# Patient Record
Sex: MALE | Race: WHITE | Employment: OTHER | URBAN - METROPOLITAN AREA
[De-identification: names, ages, dates, MRNs, and addresses within clinical notes are randomized per-mention and may not be internally consistent; named-entity substitution may affect disease eponyms.]

---

## 2017-01-20 PROBLEM — M54.6 ACUTE BILATERAL THORACIC BACK PAIN: Status: ACTIVE | Noted: 2017-01-20

## 2017-01-24 ENCOUNTER — OFFICE VISIT (OUTPATIENT)
Dept: DERMATOLOGY | Age: 77
End: 2017-01-24

## 2017-01-24 DIAGNOSIS — L57.0 AK (ACTINIC KERATOSIS): Primary | ICD-10-CM

## 2017-01-24 PROCEDURE — 17004 DESTROY PREMAL LESIONS 15/>: CPT | Performed by: DERMATOLOGY

## 2017-07-14 PROBLEM — T14.8XXA BRUISING: Status: ACTIVE | Noted: 2017-07-14

## 2017-07-25 ENCOUNTER — OFFICE VISIT (OUTPATIENT)
Dept: DERMATOLOGY | Age: 77
End: 2017-07-25

## 2017-07-25 DIAGNOSIS — L82.0 INFLAMED SEBORRHEIC KERATOSIS: ICD-10-CM

## 2017-07-25 DIAGNOSIS — L57.0 AK (ACTINIC KERATOSIS): Primary | ICD-10-CM

## 2017-07-25 PROCEDURE — 99213 OFFICE O/P EST LOW 20 MIN: CPT | Performed by: DERMATOLOGY

## 2017-07-25 PROCEDURE — 17110 DESTRUCTION B9 LES UP TO 14: CPT | Performed by: DERMATOLOGY

## 2017-07-25 PROCEDURE — 17003 DESTRUCT PREMALG LES 2-14: CPT | Performed by: DERMATOLOGY

## 2017-07-25 PROCEDURE — 17000 DESTRUCT PREMALG LESION: CPT | Performed by: DERMATOLOGY

## 2017-07-25 RX ORDER — FLUOROURACIL 50 MG/G
CREAM TOPICAL
Qty: 40 G | Refills: 0 | Status: SHIPPED | OUTPATIENT
Start: 2017-07-25 | End: 2021-03-01

## 2017-11-21 ENCOUNTER — OFFICE VISIT (OUTPATIENT)
Dept: FAMILY MEDICINE CLINIC | Age: 77
End: 2017-11-21

## 2017-11-21 VITALS
DIASTOLIC BLOOD PRESSURE: 80 MMHG | BODY MASS INDEX: 30.33 KG/M2 | OXYGEN SATURATION: 98 % | SYSTOLIC BLOOD PRESSURE: 130 MMHG | WEIGHT: 215 LBS | TEMPERATURE: 97.2 F

## 2017-11-21 DIAGNOSIS — J06.9 VIRAL URI: Primary | ICD-10-CM

## 2017-11-21 PROCEDURE — 99213 OFFICE O/P EST LOW 20 MIN: CPT | Performed by: FAMILY MEDICINE

## 2017-11-21 PROCEDURE — 4040F PNEUMOC VAC/ADMIN/RCVD: CPT | Performed by: FAMILY MEDICINE

## 2017-11-21 PROCEDURE — 1036F TOBACCO NON-USER: CPT | Performed by: FAMILY MEDICINE

## 2017-11-21 PROCEDURE — G8417 CALC BMI ABV UP PARAM F/U: HCPCS | Performed by: FAMILY MEDICINE

## 2017-11-21 PROCEDURE — G8484 FLU IMMUNIZE NO ADMIN: HCPCS | Performed by: FAMILY MEDICINE

## 2017-11-21 PROCEDURE — G8598 ASA/ANTIPLAT THER USED: HCPCS | Performed by: FAMILY MEDICINE

## 2017-11-21 PROCEDURE — G8427 DOCREV CUR MEDS BY ELIG CLIN: HCPCS | Performed by: FAMILY MEDICINE

## 2017-11-21 PROCEDURE — 1123F ACP DISCUSS/DSCN MKR DOCD: CPT | Performed by: FAMILY MEDICINE

## 2017-11-21 RX ORDER — BENZONATATE 100 MG/1
100 CAPSULE ORAL 3 TIMES DAILY PRN
Qty: 30 CAPSULE | Refills: 1 | Status: SHIPPED | OUTPATIENT
Start: 2017-11-21 | End: 2017-12-01

## 2017-11-21 RX ORDER — IPRATROPIUM BROMIDE 42 UG/1
2 SPRAY, METERED NASAL 3 TIMES DAILY
Qty: 1 BOTTLE | Refills: 0 | Status: SHIPPED | OUTPATIENT
Start: 2017-11-21 | End: 2018-02-09

## 2017-11-21 RX ORDER — AMOXICILLIN AND CLAVULANATE POTASSIUM 875; 125 MG/1; MG/1
1 TABLET, FILM COATED ORAL 2 TIMES DAILY
Qty: 20 TABLET | Refills: 0 | Status: SHIPPED | OUTPATIENT
Start: 2017-11-21 | End: 2017-12-01

## 2017-11-21 ASSESSMENT — ENCOUNTER SYMPTOMS
SORE THROAT: 1
SHORTNESS OF BREATH: 0
COUGH: 1
WHEEZING: 0
SINUS PAIN: 0

## 2017-11-21 NOTE — PROGRESS NOTES
Arabella iYng  : 1940  Encounter date: 2017    This is a 76 y.o. male who presents with  Chief Complaint   Patient presents with    URI     Productive cough, nasal congestion, ear pain, sore throat. No known fever. Symptoms started about 4-5 days ago. History of present illness:    Got home yesterday from [place]. Last 4-5 days started w sx. Friend in [place] had bronchitis. Watery eyes, runny nose, hard cough, congestion. Uses cpap at night and is able to sleep through night. Would like to have some symptoms relief before the holidays. No fevers, no chills, no fatigue. Allergies   Allergen Reactions    Cardizem [Diltiazem Hcl]      bradycardia    Celebrex [Celecoxib] Other (See Comments)     Ineffective.  Darvon [Propoxyphene Hcl]     Percocet [Oxycodone-Acetaminophen]     Procardia [Nifedipine]      edema     Outpatient Prescriptions Marked as Taking for the 17 encounter (Office Visit) with Bibiana Vu MD   Medication Sig Dispense Refill    ipratropium (ATROVENT) 0.06 % nasal spray 2 sprays by Nasal route 3 times daily 1 Bottle 0    benzonatate (TESSALON PERLES) 100 MG capsule Take 1 capsule by mouth 3 times daily as needed for Cough 30 capsule 1    amoxicillin-clavulanate (AUGMENTIN) 875-125 MG per tablet Take 1 tablet by mouth 2 times daily for 10 days 20 tablet 0    LORazepam (ATIVAN) 0.5 MG tablet TAKE 1 TABLET TWICE DAILY AS NEEDED 409 tablet 0    folic acid (FOLVITE) 1 MG tablet TAKE 1 TABLET EVERY DAY (SUBSTITUTED FOR FOLVITE) 90 tablet 1    clopidogrel (PLAVIX) 75 MG tablet TAKE 1 TABLET EVERY DAY 90 tablet 3    metFORMIN (GLUCOPHAGE) 1000 MG tablet TAKE 1/2 TABLET EVERY MORNING  AND TAKE 1 TABLET EVERY EVENING 135 tablet 0    glimepiride (AMARYL) 4 MG tablet TAKE 1 TABLET TWICE DAILY 180 tablet 3    fluorouracil (EFUDEX) 5 % cream Apply twice daily to the top of the scalp twice per day for 14 days.  40 g 0    ONE TOUCH ULTRA TEST strip is cooperative. HENT:   Right Ear: Tympanic membrane and ear canal normal.   Left Ear: Tympanic membrane and ear canal normal.   Nose: Mucosal edema present. Right sinus exhibits no maxillary sinus tenderness and no frontal sinus tenderness. Left sinus exhibits no maxillary sinus tenderness and no frontal sinus tenderness. Mouth/Throat: Uvula is midline. Posterior oropharyngeal erythema present. Neck: Neck supple. Pulmonary/Chest: Effort normal. No accessory muscle usage. No respiratory distress. He has no decreased breath sounds. He has rhonchi (slight bilat). He exhibits no deformity. Lymphadenopathy:     He has no cervical adenopathy. Neurological: He is alert. Assessment/Plan    1. Viral URI  Recommended treatment with Atrovent nasal spray, Tessalon Perles for symptom relief. Due to the upcoming holiday weekend did prevent antibiotic in case of worsening symptoms including fever or more productive cough. No Follow-up on file.

## 2018-01-04 ENCOUNTER — OFFICE VISIT (OUTPATIENT)
Dept: FAMILY MEDICINE CLINIC | Age: 78
End: 2018-01-04

## 2018-01-04 VITALS
BODY MASS INDEX: 30.33 KG/M2 | DIASTOLIC BLOOD PRESSURE: 84 MMHG | SYSTOLIC BLOOD PRESSURE: 140 MMHG | HEART RATE: 74 BPM | TEMPERATURE: 98.1 F | OXYGEN SATURATION: 98 % | WEIGHT: 215 LBS

## 2018-01-04 DIAGNOSIS — B96.89 ACUTE BACTERIAL SINUSITIS: Primary | ICD-10-CM

## 2018-01-04 DIAGNOSIS — R05.9 COUGH: ICD-10-CM

## 2018-01-04 DIAGNOSIS — J01.90 ACUTE BACTERIAL SINUSITIS: Primary | ICD-10-CM

## 2018-01-04 PROCEDURE — 1036F TOBACCO NON-USER: CPT | Performed by: FAMILY MEDICINE

## 2018-01-04 PROCEDURE — G8484 FLU IMMUNIZE NO ADMIN: HCPCS | Performed by: FAMILY MEDICINE

## 2018-01-04 PROCEDURE — 1123F ACP DISCUSS/DSCN MKR DOCD: CPT | Performed by: FAMILY MEDICINE

## 2018-01-04 PROCEDURE — G8417 CALC BMI ABV UP PARAM F/U: HCPCS | Performed by: FAMILY MEDICINE

## 2018-01-04 PROCEDURE — G8598 ASA/ANTIPLAT THER USED: HCPCS | Performed by: FAMILY MEDICINE

## 2018-01-04 PROCEDURE — 99213 OFFICE O/P EST LOW 20 MIN: CPT | Performed by: FAMILY MEDICINE

## 2018-01-04 PROCEDURE — G8427 DOCREV CUR MEDS BY ELIG CLIN: HCPCS | Performed by: FAMILY MEDICINE

## 2018-01-04 PROCEDURE — 94640 AIRWAY INHALATION TREATMENT: CPT | Performed by: FAMILY MEDICINE

## 2018-01-04 PROCEDURE — 4040F PNEUMOC VAC/ADMIN/RCVD: CPT | Performed by: FAMILY MEDICINE

## 2018-01-04 RX ORDER — AMOXICILLIN AND CLAVULANATE POTASSIUM 875; 125 MG/1; MG/1
1 TABLET, FILM COATED ORAL 2 TIMES DAILY
Qty: 20 TABLET | Refills: 0 | Status: SHIPPED | OUTPATIENT
Start: 2018-01-04 | End: 2018-01-14

## 2018-01-04 RX ORDER — BENZONATATE 100 MG/1
100 CAPSULE ORAL 3 TIMES DAILY PRN
Qty: 30 CAPSULE | Refills: 2 | Status: SHIPPED | OUTPATIENT
Start: 2018-01-04 | End: 2018-01-14

## 2018-01-04 RX ORDER — ALBUTEROL SULFATE 2.5 MG/3ML
2.5 SOLUTION RESPIRATORY (INHALATION) ONCE
Status: COMPLETED | OUTPATIENT
Start: 2018-01-04 | End: 2018-01-04

## 2018-01-04 RX ADMIN — ALBUTEROL SULFATE 2.5 MG: 2.5 SOLUTION RESPIRATORY (INHALATION) at 15:00

## 2018-01-04 ASSESSMENT — ENCOUNTER SYMPTOMS
SHORTNESS OF BREATH: 1
COUGH: 1
EYE ITCHING: 0
WHEEZING: 0
EYE DISCHARGE: 0
EYE PAIN: 0
EYE REDNESS: 0
SINUS PRESSURE: 1
SINUS PAIN: 1
TROUBLE SWALLOWING: 0

## 2018-01-04 NOTE — PROGRESS NOTES
Zonia Lawrence  : 1940  Encounter date: 2018    This is a 76 y.o. male who presents with  Chief Complaint   Patient presents with    Cough     off & on since 17       History of present illness:    Use symptomatic treatment after last visit and did seem to improve somewhat, but symptoms have been worse in the last week. Hasn't gotten better since last visit. Hard coughing, but not productive. Nose drips, but can't blow anything out. OTC decongestant helps, tessalon perrles help. Never took antibiotic. No fevers that he knows of. Little short of breath, little wheezy; worse with lying down. Sinus pain - feels like hit in face. No swelling in legs. Allergies   Allergen Reactions    Cardizem [Diltiazem Hcl]      bradycardia    Celebrex [Celecoxib] Other (See Comments)     Ineffective.      Darvon [Propoxyphene Hcl]     Percocet [Oxycodone-Acetaminophen]     Procardia [Nifedipine]      edema     Outpatient Prescriptions Marked as Taking for the 18 encounter (Office Visit) with Walt Cee MD   Medication Sig Dispense Refill    amoxicillin-clavulanate (AUGMENTIN) 875-125 MG per tablet Take 1 tablet by mouth 2 times daily for 10 days 20 tablet 0    benzonatate (TESSALON PERLES) 100 MG capsule Take 1 capsule by mouth 3 times daily as needed for Cough 30 capsule 2    metFORMIN (GLUCOPHAGE) 1000 MG tablet TAKE 1/2 TABLET EVERY MORNING  AND TAKE 1 TABLET EVERY EVENING 135 tablet 0    ipratropium (ATROVENT) 0.06 % nasal spray 2 sprays by Nasal route 3 times daily 1 Bottle 0    LORazepam (ATIVAN) 0.5 MG tablet TAKE 1 TABLET TWICE DAILY AS NEEDED 425 tablet 0    folic acid (FOLVITE) 1 MG tablet TAKE 1 TABLET EVERY DAY (SUBSTITUTED FOR FOLVITE) 90 tablet 1    clopidogrel (PLAVIX) 75 MG tablet TAKE 1 TABLET EVERY DAY 90 tablet 3    glimepiride (AMARYL) 4 MG tablet TAKE 1 TABLET TWICE DAILY 180 tablet 3    fluorouracil (EFUDEX) 5 % cream Apply twice daily to the top of 07/14/17 120/80     Wt Readings from Last 3 Encounters:   01/04/18 215 lb (97.5 kg)   11/21/17 215 lb (97.5 kg)   07/14/17 207 lb (93.9 kg)       Physical Exam   Constitutional: He appears well-developed and well-nourished. He is cooperative. No distress. HENT:   Right Ear: Tympanic membrane and ear canal normal.   Left Ear: Tympanic membrane and ear canal normal.   Nose: Mucosal edema and rhinorrhea (purulent) present. Right sinus exhibits maxillary sinus tenderness and frontal sinus tenderness. Left sinus exhibits maxillary sinus tenderness and frontal sinus tenderness. Mouth/Throat: Mucous membranes are normal. Posterior oropharyngeal edema (Cobblestoning) and posterior oropharyngeal erythema present. Neck: Neck supple. Cardiovascular: Normal rate, regular rhythm and normal heart sounds. Pulmonary/Chest: Effort normal. He has decreased breath sounds (At bases). He has wheezes (Slight end expiratory wheeze right lower lobe). He has rhonchi (Left). He has no rales. Lymphadenopathy:     He has cervical adenopathy (Bilateral anterior lymphadenopathy. ). Neurological: He is alert. Assessment/Plan    1. Acute bacterial sinusitis  Symptoms have worsened since last visit and has been ongoing for over a month. We'll cover with Augmentin. Let us know if not improved by early next week or with any worsening. I suspect that cough will improve as sinus infection is treated. 2. Cough  Tessalon Perles prescribed symptomatic relief so these were refilled today. Albuterol neb given in the office to help with wheezing. If cough is not improving will need to reassess. Return if symptoms worsen or fail to improve.

## 2018-01-25 ENCOUNTER — OFFICE VISIT (OUTPATIENT)
Dept: DERMATOLOGY | Age: 78
End: 2018-01-25

## 2018-01-25 DIAGNOSIS — Z85.828 HISTORY OF BASAL CELL CARCINOMA: ICD-10-CM

## 2018-01-25 DIAGNOSIS — D69.2 SOLAR PURPURA (HCC): ICD-10-CM

## 2018-01-25 DIAGNOSIS — L57.0 AK (ACTINIC KERATOSIS): Primary | ICD-10-CM

## 2018-01-25 PROCEDURE — 1123F ACP DISCUSS/DSCN MKR DOCD: CPT | Performed by: DERMATOLOGY

## 2018-01-25 PROCEDURE — G8484 FLU IMMUNIZE NO ADMIN: HCPCS | Performed by: DERMATOLOGY

## 2018-01-25 PROCEDURE — 99213 OFFICE O/P EST LOW 20 MIN: CPT | Performed by: DERMATOLOGY

## 2018-01-25 PROCEDURE — 17003 DESTRUCT PREMALG LES 2-14: CPT | Performed by: DERMATOLOGY

## 2018-01-25 PROCEDURE — G8417 CALC BMI ABV UP PARAM F/U: HCPCS | Performed by: DERMATOLOGY

## 2018-01-25 PROCEDURE — G8427 DOCREV CUR MEDS BY ELIG CLIN: HCPCS | Performed by: DERMATOLOGY

## 2018-01-25 PROCEDURE — 17000 DESTRUCT PREMALG LESION: CPT | Performed by: DERMATOLOGY

## 2018-01-25 PROCEDURE — G8598 ASA/ANTIPLAT THER USED: HCPCS | Performed by: DERMATOLOGY

## 2018-01-25 PROCEDURE — 4040F PNEUMOC VAC/ADMIN/RCVD: CPT | Performed by: DERMATOLOGY

## 2018-01-25 PROCEDURE — 1036F TOBACCO NON-USER: CPT | Performed by: DERMATOLOGY

## 2018-01-25 NOTE — PROGRESS NOTES
Ineffective.  Darvon [Propoxyphene Hcl]     Percocet [Oxycodone-Acetaminophen]     Procardia [Nifedipine]      edema     Outpatient Prescriptions Marked as Taking for the 1/25/18 encounter (Office Visit) with Jada Acosta MD   Medication Sig Dispense Refill    metFORMIN (GLUCOPHAGE) 1000 MG tablet TAKE 1/2 TABLET EVERY MORNING  AND TAKE 1 TABLET EVERY EVENING 135 tablet 0    LORazepam (ATIVAN) 0.5 MG tablet TAKE 1 TABLET TWICE DAILY AS NEEDED 076 tablet 0    folic acid (FOLVITE) 1 MG tablet TAKE 1 TABLET EVERY DAY (SUBSTITUTED FOR FOLVITE) 90 tablet 1    clopidogrel (PLAVIX) 75 MG tablet TAKE 1 TABLET EVERY DAY 90 tablet 3    glimepiride (AMARYL) 4 MG tablet TAKE 1 TABLET TWICE DAILY 180 tablet 3    Cyanocobalamin (VITAMIN B12 PO) Take by mouth      Iron Combinations (IRON COMPLEX PO) Take by mouth      pravastatin (PRAVACHOL) 40 MG tablet Take 40 mg by mouth daily.  Multiple Vitamins-Minerals (THERAPEUTIC MULTIVITAMIN-MINERALS) tablet Take 1 tablet by mouth daily.  carvedilol (COREG) 12.5 MG tablet Take 12.5 mg by mouth 2 times daily (with meals).  Cholecalciferol (VITAMIN D-3) 1000 UNITS CAPS Take 1 capsule by mouth daily.  acetaminophen (TYLENOL) 500 MG tablet Take 500 mg by mouth every 6 hours as needed for Pain.  loperamide (IMODIUM) 2 MG capsule Take 2 mg by mouth as needed for Diarrhea.  omeprazole (PRILOSEC) 40 MG capsule Take 40 mg by mouth daily.  spironolactone (ALDACTONE) 25 MG tablet Take 25 mg by mouth daily.  tamsulosin (FLOMAX) 0.4 MG capsule Take 0.4 mg by mouth daily.  aspirin 81 MG tablet Take 81 mg by mouth daily.  Ascorbic Acid (VITAMIN C) 250 MG tablet Take 500 mg by mouth daily. Physical Examination       The following were examined and determined to be normal: Psych/Neuro, Conjunctivae/eyelids, Gums/teeth/lips, Neck, Breast/axilla/chest, Abdomen, Back, RUE and LUE.     The following were examined and

## 2018-02-09 ENCOUNTER — OFFICE VISIT (OUTPATIENT)
Dept: FAMILY MEDICINE CLINIC | Age: 78
End: 2018-02-09

## 2018-02-09 VITALS
HEART RATE: 69 BPM | DIASTOLIC BLOOD PRESSURE: 70 MMHG | OXYGEN SATURATION: 97 % | BODY MASS INDEX: 29.54 KG/M2 | HEIGHT: 71 IN | SYSTOLIC BLOOD PRESSURE: 132 MMHG | WEIGHT: 211 LBS

## 2018-02-09 DIAGNOSIS — G62.9 NEUROPATHY: Primary | ICD-10-CM

## 2018-02-09 DIAGNOSIS — G47.00 INSOMNIA, UNSPECIFIED TYPE: ICD-10-CM

## 2018-02-09 DIAGNOSIS — Z12.5 SCREENING FOR PROSTATE CANCER: ICD-10-CM

## 2018-02-09 DIAGNOSIS — E11.43 TYPE II DIABETES MELLITUS WITH PERIPHERAL AUTONOMIC NEUROPATHY (HCC): ICD-10-CM

## 2018-02-09 DIAGNOSIS — E78.5 HYPERLIPIDEMIA, UNSPECIFIED HYPERLIPIDEMIA TYPE: ICD-10-CM

## 2018-02-09 DIAGNOSIS — E61.1 IRON DEFICIENCY: ICD-10-CM

## 2018-02-09 DIAGNOSIS — I25.10 ATHEROSCLEROSIS OF NATIVE CORONARY ARTERY OF NATIVE HEART WITHOUT ANGINA PECTORIS: ICD-10-CM

## 2018-02-09 DIAGNOSIS — R06.2 WHEEZE: ICD-10-CM

## 2018-02-09 DIAGNOSIS — D64.9 ANEMIA, UNSPECIFIED TYPE: ICD-10-CM

## 2018-02-09 DIAGNOSIS — I10 ESSENTIAL HYPERTENSION: ICD-10-CM

## 2018-02-09 DIAGNOSIS — K21.9 GASTROESOPHAGEAL REFLUX DISEASE, ESOPHAGITIS PRESENCE NOT SPECIFIED: ICD-10-CM

## 2018-02-09 DIAGNOSIS — G47.33 OBSTRUCTIVE SLEEP APNEA SYNDROME: ICD-10-CM

## 2018-02-09 DIAGNOSIS — R07.89 CHEST TIGHTNESS: ICD-10-CM

## 2018-02-09 DIAGNOSIS — R19.5 LOOSE STOOLS: ICD-10-CM

## 2018-02-09 PROCEDURE — 99214 OFFICE O/P EST MOD 30 MIN: CPT | Performed by: FAMILY MEDICINE

## 2018-02-09 PROCEDURE — 1123F ACP DISCUSS/DSCN MKR DOCD: CPT | Performed by: FAMILY MEDICINE

## 2018-02-09 PROCEDURE — 1036F TOBACCO NON-USER: CPT | Performed by: FAMILY MEDICINE

## 2018-02-09 PROCEDURE — 4040F PNEUMOC VAC/ADMIN/RCVD: CPT | Performed by: FAMILY MEDICINE

## 2018-02-09 PROCEDURE — G8427 DOCREV CUR MEDS BY ELIG CLIN: HCPCS | Performed by: FAMILY MEDICINE

## 2018-02-09 PROCEDURE — G8484 FLU IMMUNIZE NO ADMIN: HCPCS | Performed by: FAMILY MEDICINE

## 2018-02-09 PROCEDURE — G8598 ASA/ANTIPLAT THER USED: HCPCS | Performed by: FAMILY MEDICINE

## 2018-02-09 PROCEDURE — 93000 ELECTROCARDIOGRAM COMPLETE: CPT | Performed by: FAMILY MEDICINE

## 2018-02-09 PROCEDURE — G8417 CALC BMI ABV UP PARAM F/U: HCPCS | Performed by: FAMILY MEDICINE

## 2018-02-09 RX ORDER — NITROGLYCERIN 0.4 MG/1
0.4 TABLET SUBLINGUAL EVERY 5 MIN PRN
Qty: 10 TABLET | Refills: 3 | Status: SHIPPED | OUTPATIENT
Start: 2018-02-09 | End: 2018-02-16 | Stop reason: SDUPTHER

## 2018-02-09 RX ORDER — LORAZEPAM 0.5 MG/1
TABLET ORAL
Qty: 180 TABLET | Refills: 0 | Status: SHIPPED | OUTPATIENT
Start: 2018-02-09 | End: 2018-05-29

## 2018-02-09 ASSESSMENT — PATIENT HEALTH QUESTIONNAIRE - PHQ9
SUM OF ALL RESPONSES TO PHQ9 QUESTIONS 1 & 2: 0
1. LITTLE INTEREST OR PLEASURE IN DOING THINGS: 0
2. FEELING DOWN, DEPRESSED OR HOPELESS: 0
SUM OF ALL RESPONSES TO PHQ QUESTIONS 1-9: 0

## 2018-02-09 ASSESSMENT — ENCOUNTER SYMPTOMS
SHORTNESS OF BREATH: 0
ABDOMINAL PAIN: 0
DIARRHEA: 1
CONSTIPATION: 0
CHEST TIGHTNESS: 0
COUGH: 0

## 2018-02-09 NOTE — PROGRESS NOTES
Aracelis Madsen  : 1940  Encounter date: 2018    This is a [de-identified] y.o. male who presents with  Chief Complaint   Patient presents with   [address]     Patient states he has list that he will go over with doctor. History of present illness:  Had some chest pain this week - tightness left side; after he got home from work. Went away in a couple of hours. Suffering with numbness in hands/feet. He has seen ads on tv/heard about treatments. When he goes to sleep at night has burning in feet that is bad. Fingers also go a little numb at this time. Would like to lose weight; has had success with weight watchers in past. Doesn't regularly test blood sugars at home. Usually is pretty careful about what he is eating. Doesn't feel numbness in all fingers at same time; different fingers. Has some trouble walking. Feels erratic with walking; not strong. Steps occasionally in front of one foot which throws off his balance. Would like some work up. Has diarrhea since having colon resection. Takes metamucil daily. Not helping. Takes imodium which helps. Wife suggested PSA, A1C. Wanted to ask about 2 pills that have gotten bad wrap - metformin, omeprazole. Tried to decrease the omeprazole to every other day and when he did he developed more acid/stomach issues. Wears his CPAP which he hates but he wears regularly. He is about to cancel trip out to [address] [place] [address] [place] feel like he can walk as far as he needs; also worries about bowels. Has oncologist, nephrologist, cardiologist, dermatology. Allergies   Allergen Reactions    Cardizem [Diltiazem Hcl]      bradycardia    Celebrex [Celecoxib] Other (See Comments)     Ineffective.      Darvon [Propoxyphene Hcl]     Percocet [Oxycodone-Acetaminophen]     Procardia [Nifedipine]      edema     Outpatient Prescriptions Marked as Taking for the 18 encounter (Office Visit) with Amber Munoz MD   Medication Sig Dispense Refill C) 250 MG tablet Take 500 mg by mouth daily. Review of Systems   Constitutional: Positive for fatigue (not as much energy as he used to have). Respiratory: Negative for cough, chest tightness and shortness of breath. Cardiovascular: Positive for chest pain (see hpi; not presently. Not occurred since episodes last week). Gastrointestinal: Positive for diarrhea (see hpi; improves with the imodium). Negative for abdominal pain and constipation. Musculoskeletal: Positive for gait problem (see hpi). Neurological: Positive for numbness. Negative for dizziness, tremors, weakness and light-headedness. Objective:    /70 (Site: Left Arm, Position: Sitting, Cuff Size: Large Adult)   Pulse 69   Ht 5' 11.25\" (1.81 m)   Wt 211 lb (95.7 kg)   SpO2 97%   BMI 29.22 kg/m²   Weight: 211 lb (95.7 kg)     BP Readings from Last 3 Encounters:   02/09/18 132/70   01/04/18 (!) 140/84   11/21/17 130/80     Wt Readings from Last 3 Encounters:   02/09/18 211 lb (95.7 kg)   01/04/18 215 lb (97.5 kg)   11/21/17 215 lb (97.5 kg)       Physical Exam   Constitutional: He appears well-developed and well-nourished. No distress. Neck: Normal range of motion. Neck supple. No thyromegaly present. Cardiovascular: Normal rate, regular rhythm and normal heart sounds. Pulmonary/Chest: Effort normal. He has no decreased breath sounds. He has wheezes (end insp wheeze left upper lobe). He has no rhonchi. He has no rales. Lymphadenopathy:     He has no cervical adenopathy. Monofilament Sensation:  abnormal - no sensation toes bilat; none ball of feet; sensation mid foot and heel normal.   Pulses:  normal,   Edema: abnormal - 1+ edema,  Skin lesions: normal   Varicosities bilat ankles        Assessment/Plan    1. Neuropathy ([address]  Suspect secondary to diabetes. We will have her complete some basic labs and make sure there are no other contributing factors. - TSH without Reflex;  Future  - Vitamin B12; Future  - Future    11. Gastroesophageal reflux disease, esophagitis presence not specified  I'm not certain that breath testing is available anymore through hospital. We discussed that be okay for him to decrease to 20 mg of omeprazole daily and see if this helps control symptoms or switch over to Zantac twice a day which would come less side effects than the omeprazole.  - H. Pylori Breath Test; Future    12. Anemia, unspecified type  - CBC with Differential; Future  - Ferritin; Future  - Iron and TIBC; Future    13. Screening for prostate cancer  We discussed that it his age we do not routinely screen with PSA for prostate cancer. I'm not certain insurance will cover this testing. I advised him to ask about cost at the lab in case this is not a covered test.  - PSA, Prostatic Specific Antigen; Future    14. Loose stools  Advised okay for him to use Imodium for loose stools. His loose stools are secondary to mechanical change since his colonoscopy. Imodium does help with his symptoms. I have also advised him to run the suggestion by his GI doctor. Return in about 1 month (around 3/9/2018).

## 2018-02-09 NOTE — PATIENT INSTRUCTIONS
Consider gabapentin for neuropathy at bedtime. For acid reflux: it's reasonable to try to decrease the omeprazole to 20mg daily or try without but take caution with reflux triggers: caffeine/coffee, Avoid eating 2 hours before bedtime, consider elevating the head of your bed, avoid chocolate, spicy, citrus, greasy, tomato. Smaller meals are better.

## 2018-02-16 RX ORDER — NITROGLYCERIN 0.4 MG/1
0.4 TABLET SUBLINGUAL EVERY 5 MIN PRN
Qty: 25 TABLET | Refills: 3 | OUTPATIENT
Start: 2018-02-16

## 2018-03-08 ENCOUNTER — HOSPITAL ENCOUNTER (OUTPATIENT)
Dept: OTHER | Age: 78
Discharge: HOME | End: 2018-03-08
Attending: FAMILY MEDICINE | Admitting: FAMILY MEDICINE

## 2018-03-08 DIAGNOSIS — E11.43 TYPE II DIABETES MELLITUS WITH PERIPHERAL AUTONOMIC NEUROPATHY (HCC): ICD-10-CM

## 2018-03-08 DIAGNOSIS — E61.1 IRON DEFICIENCY: ICD-10-CM

## 2018-03-08 DIAGNOSIS — Z12.5 SCREENING FOR PROSTATE CANCER: ICD-10-CM

## 2018-03-08 DIAGNOSIS — I10 ESSENTIAL HYPERTENSION: ICD-10-CM

## 2018-03-08 DIAGNOSIS — D64.9 ANEMIA, UNSPECIFIED TYPE: ICD-10-CM

## 2018-03-08 DIAGNOSIS — R06.2 WHEEZE: ICD-10-CM

## 2018-03-08 DIAGNOSIS — G62.9 NEUROPATHY: ICD-10-CM

## 2018-03-08 DIAGNOSIS — E78.5 HYPERLIPIDEMIA, UNSPECIFIED HYPERLIPIDEMIA TYPE: ICD-10-CM

## 2018-03-08 LAB
A/G RATIO: 1.4 (ref 1.1–2.2)
ALBUMIN SERPL-MCNC: 4.5 G/DL (ref 3.4–5)
ALP BLD-CCNC: 50 U/L (ref 40–129)
ALT SERPL-CCNC: 20 U/L (ref 10–40)
ANION GAP SERPL CALCULATED.3IONS-SCNC: 16 MMOL/L (ref 3–16)
AST SERPL-CCNC: 24 U/L (ref 15–37)
BASOPHILS ABSOLUTE: 0 K/UL (ref 0–0.2)
BASOPHILS RELATIVE PERCENT: 0.6 %
BILIRUB SERPL-MCNC: 0.8 MG/DL (ref 0–1)
BUN BLDV-MCNC: 25 MG/DL (ref 7–20)
CALCIUM SERPL-MCNC: 9.1 MG/DL (ref 8.3–10.6)
CHLORIDE BLD-SCNC: 100 MMOL/L (ref 99–110)
CHOLESTEROL, TOTAL: 102 MG/DL (ref 0–199)
CO2: 22 MMOL/L (ref 21–32)
CREAT SERPL-MCNC: 1.3 MG/DL (ref 0.8–1.3)
CREATININE URINE: 84.5 MG/DL (ref 39–259)
EOSINOPHILS ABSOLUTE: 0.2 K/UL (ref 0–0.6)
EOSINOPHILS RELATIVE PERCENT: 2.3 %
ESTIMATED AVERAGE GLUCOSE: 128.4 MG/DL
FERRITIN: 109.3 NG/ML (ref 30–400)
GFR AFRICAN AMERICAN: >60
GFR NON-AFRICAN AMERICAN: 53
GLOBULIN: 3.3 G/DL
GLUCOSE BLD-MCNC: 138 MG/DL (ref 70–99)
HBA1C MFR BLD: 6.1 %
HCT VFR BLD CALC: 39.8 % (ref 40.5–52.5)
HDLC SERPL-MCNC: 43 MG/DL (ref 40–60)
HEMOGLOBIN: 13.6 G/DL (ref 13.5–17.5)
IRON SATURATION: 31 % (ref 20–50)
IRON: 103 UG/DL (ref 59–158)
LDL CHOLESTEROL CALCULATED: 31 MG/DL
LYMPHOCYTES ABSOLUTE: 3 K/UL (ref 1–5.1)
LYMPHOCYTES RELATIVE PERCENT: 38.1 %
MCH RBC QN AUTO: 31 PG (ref 26–34)
MCHC RBC AUTO-ENTMCNC: 34.3 G/DL (ref 31–36)
MCV RBC AUTO: 90.4 FL (ref 80–100)
MICROALBUMIN UR-MCNC: <1.2 MG/DL
MICROALBUMIN/CREAT UR-RTO: NORMAL MG/G (ref 0–30)
MONOCYTES ABSOLUTE: 0.9 K/UL (ref 0–1.3)
MONOCYTES RELATIVE PERCENT: 11.6 %
NEUTROPHILS ABSOLUTE: 3.8 K/UL (ref 1.7–7.7)
NEUTROPHILS RELATIVE PERCENT: 47.4 %
PDW BLD-RTO: 13.1 % (ref 12.4–15.4)
PLATELET # BLD: 169 K/UL (ref 135–450)
PMV BLD AUTO: 9.2 FL (ref 5–10.5)
POTASSIUM SERPL-SCNC: 5.5 MMOL/L (ref 3.5–5.1)
PROSTATE SPECIFIC ANTIGEN: 0.26 NG/ML (ref 0–4)
RBC # BLD: 4.4 M/UL (ref 4.2–5.9)
SODIUM BLD-SCNC: 138 MMOL/L (ref 136–145)
TOTAL IRON BINDING CAPACITY: 334 UG/DL (ref 260–445)
TOTAL PROTEIN: 7.8 G/DL (ref 6.4–8.2)
TRIGL SERPL-MCNC: 142 MG/DL (ref 0–150)
TSH SERPL DL<=0.05 MIU/L-ACNC: 2.61 UIU/ML (ref 0.27–4.2)
VITAMIN B-12: 997 PG/ML (ref 211–911)
VLDLC SERPL CALC-MCNC: 28 MG/DL
WBC # BLD: 7.9 K/UL (ref 4–11)

## 2018-03-09 DIAGNOSIS — E87.5 HIGH POTASSIUM: Primary | ICD-10-CM

## 2018-03-15 ENCOUNTER — HOSPITAL ENCOUNTER (OUTPATIENT)
Dept: OTHER | Age: 78
Discharge: HOME | End: 2018-03-15
Attending: FAMILY MEDICINE | Admitting: FAMILY MEDICINE

## 2018-03-22 ENCOUNTER — OFFICE VISIT (OUTPATIENT)
Dept: FAMILY MEDICINE CLINIC | Age: 78
End: 2018-03-22

## 2018-03-22 VITALS
BODY MASS INDEX: 29.08 KG/M2 | SYSTOLIC BLOOD PRESSURE: 120 MMHG | DIASTOLIC BLOOD PRESSURE: 70 MMHG | OXYGEN SATURATION: 97 % | HEART RATE: 66 BPM | WEIGHT: 210 LBS

## 2018-03-22 DIAGNOSIS — R19.7 DIARRHEA, UNSPECIFIED TYPE: ICD-10-CM

## 2018-03-22 DIAGNOSIS — I10 ESSENTIAL HYPERTENSION: Primary | ICD-10-CM

## 2018-03-22 DIAGNOSIS — E11.43 TYPE II DIABETES MELLITUS WITH PERIPHERAL AUTONOMIC NEUROPATHY (HCC): ICD-10-CM

## 2018-03-22 PROCEDURE — 1036F TOBACCO NON-USER: CPT | Performed by: FAMILY MEDICINE

## 2018-03-22 PROCEDURE — G8482 FLU IMMUNIZE ORDER/ADMIN: HCPCS | Performed by: FAMILY MEDICINE

## 2018-03-22 PROCEDURE — G8417 CALC BMI ABV UP PARAM F/U: HCPCS | Performed by: FAMILY MEDICINE

## 2018-03-22 PROCEDURE — G8427 DOCREV CUR MEDS BY ELIG CLIN: HCPCS | Performed by: FAMILY MEDICINE

## 2018-03-22 PROCEDURE — 99213 OFFICE O/P EST LOW 20 MIN: CPT | Performed by: FAMILY MEDICINE

## 2018-03-22 PROCEDURE — G8598 ASA/ANTIPLAT THER USED: HCPCS | Performed by: FAMILY MEDICINE

## 2018-03-22 PROCEDURE — 4040F PNEUMOC VAC/ADMIN/RCVD: CPT | Performed by: FAMILY MEDICINE

## 2018-03-22 PROCEDURE — 1123F ACP DISCUSS/DSCN MKR DOCD: CPT | Performed by: FAMILY MEDICINE

## 2018-03-22 RX ORDER — FOLIC ACID 1 MG/1
TABLET ORAL
Qty: 90 TABLET | Refills: 2 | Status: CANCELLED | OUTPATIENT
Start: 2018-03-22

## 2018-03-22 ASSESSMENT — ENCOUNTER SYMPTOMS
ABDOMINAL PAIN: 0
ABDOMINAL DISTENTION: 0
CONSTIPATION: 0
DIARRHEA: 1
COUGH: 0
SHORTNESS OF BREATH: 0

## 2018-03-22 NOTE — PROGRESS NOTES
Nick Flynn  : 1940  Encounter date: 3/22/2018    This is a [de-identified] y.o. male who presents with  Chief Complaint   Patient presents with    1 Month Follow-Up       History of present illness:    Blood pressures have been in 136-150/67-85 at home but thinks that he needs to replace battery because he is getting some funny letters with this. Blood sugars have been in the 107, 140, 148 fasting and then 179, 198 (day after pizza), 179 post prandial.     H pylori test wasn't completed. Wondering if he should go back for this. (we discussed he doesn't need to). Takes 2 pills 2-3 times/week of imodium to function socially. Wonders if this is ok. Hx of colon resection with diarrhea issues since that time. Has cut back on omeprazole to 20mg daily; no increase in heartburn. Uses cpap with nose pads; had blood in mucous when blowing nose. Has humidified cpap. Not happened to him in past.       Allergies   Allergen Reactions    Cardizem [Diltiazem Hcl]      bradycardia    Celebrex [Celecoxib] Other (See Comments)     Ineffective.      Darvon [Propoxyphene Hcl]     Percocet [Oxycodone-Acetaminophen]     Procardia [Nifedipine]      edema     Outpatient Prescriptions Marked as Taking for the 3/22/18 encounter (Office Visit) with Maikol Pineda MD   Medication Sig Dispense Refill    metFORMIN (GLUCOPHAGE) 1000 MG tablet TAKE 1/2 TABLET EVERY MORNING  AND TAKE 1 TABLET EVERY EVENING 135 tablet 0    LORazepam (ATIVAN) 0.5 MG tablet TAKE 1 TABLET TWICE DAILY AS NEEDED. 117 tablet 0    folic acid (FOLVITE) 1 MG tablet TAKE 1 TABLET EVERY DAY (SUBSTITUTED FOR FOLVITE) 90 tablet 1    clopidogrel (PLAVIX) 75 MG tablet TAKE 1 TABLET EVERY DAY 90 tablet 3    glimepiride (AMARYL) 4 MG tablet TAKE 1 TABLET TWICE DAILY (Patient taking differently: Take 1/2 tab po BID) 180 tablet 3    ONE TOUCH ULTRA TEST strip TEST BLOOD SUGAR ONCE A DAYS AS NEEDED 100 strip 2    Cyanocobalamin (VITAMIN B12 PO) Take by mouth      Iron Combinations (IRON COMPLEX PO) Take by mouth      pravastatin (PRAVACHOL) 40 MG tablet Take 40 mg by mouth daily.  ONE TOUCH LANCETS MISC 1 each by Does not apply route daily as needed. 100 each 3    Multiple Vitamins-Minerals (THERAPEUTIC MULTIVITAMIN-MINERALS) tablet Take 1 tablet by mouth daily.  carvedilol (COREG) 12.5 MG tablet Take 12.5 mg by mouth 2 times daily (with meals).  Cholecalciferol (VITAMIN D-3) 1000 UNITS CAPS Take 1 capsule by mouth daily.  acetaminophen (TYLENOL) 500 MG tablet Take 500 mg by mouth every 6 hours as needed for Pain.  loperamide (IMODIUM) 2 MG capsule Take 2 mg by mouth as needed for Diarrhea.  Blood Glucose Calibration (ONETOUCH ULTRA CONTROL VI) by In Vitro route.  omeprazole (PRILOSEC) 40 MG capsule Take 40 mg by mouth daily.  spironolactone (ALDACTONE) 25 MG tablet Take 25 mg by mouth daily.  tamsulosin (FLOMAX) 0.4 MG capsule Take 0.4 mg by mouth daily.  aspirin 81 MG tablet Take 81 mg by mouth daily.  Ascorbic Acid (VITAMIN C) 250 MG tablet Take 500 mg by mouth daily. Review of Systems   Constitutional: Negative for chills and fever. HENT: Negative for congestion and postnasal drip. Had blood when blowing nose this morning; see hpi   Respiratory: Negative for cough and shortness of breath. Cardiovascular: Negative for chest pain. Gastrointestinal: Positive for diarrhea. Negative for abdominal distention, abdominal pain and constipation. Objective:    /70   Pulse 66   Wt 210 lb (95.3 kg)   SpO2 97%   BMI 29.08 kg/m²   Weight: 210 lb (95.3 kg)     BP Readings from Last 3 Encounters:   03/22/18 120/70   02/09/18 132/70   01/04/18 (!) 140/84     Wt Readings from Last 3 Encounters:   03/22/18 210 lb (95.3 kg)   02/09/18 211 lb (95.7 kg)   01/04/18 215 lb (97.5 kg)       Physical Exam   Constitutional: He appears well-developed and well-nourished.    HENT:

## 2018-03-29 ENCOUNTER — TELEPHONE (OUTPATIENT)
Dept: FAMILY MEDICINE CLINIC | Age: 78
End: 2018-03-29

## 2018-03-29 DIAGNOSIS — E61.1 IRON DEFICIENCY: Primary | ICD-10-CM

## 2018-04-16 DIAGNOSIS — E61.1 IRON DEFICIENCY: ICD-10-CM

## 2018-04-16 DIAGNOSIS — E87.5 HIGH POTASSIUM: ICD-10-CM

## 2018-04-17 LAB
ANION GAP SERPL CALCULATED.3IONS-SCNC: 15 MMOL/L (ref 3–16)
BUN BLDV-MCNC: 25 MG/DL (ref 7–20)
CALCIUM SERPL-MCNC: 9.5 MG/DL (ref 8.3–10.6)
CHLORIDE BLD-SCNC: 103 MMOL/L (ref 99–110)
CO2: 24 MMOL/L (ref 21–32)
CREAT SERPL-MCNC: 1.2 MG/DL (ref 0.8–1.3)
FOLATE: >20 NG/ML (ref 4.78–24.2)
GFR AFRICAN AMERICAN: >60
GFR NON-AFRICAN AMERICAN: 58
GLUCOSE BLD-MCNC: 145 MG/DL (ref 70–99)
POTASSIUM SERPL-SCNC: 4.7 MMOL/L (ref 3.5–5.1)
SODIUM BLD-SCNC: 142 MMOL/L (ref 136–145)
VITAMIN B-12: 838 PG/ML (ref 211–911)

## 2018-05-18 ENCOUNTER — TELEPHONE (OUTPATIENT)
Dept: FAMILY MEDICINE CLINIC | Age: 78
End: 2018-05-18

## 2018-05-24 DIAGNOSIS — E11.43 TYPE II DIABETES MELLITUS WITH PERIPHERAL AUTONOMIC NEUROPATHY (HCC): Primary | ICD-10-CM

## 2018-05-29 ENCOUNTER — OFFICE VISIT (OUTPATIENT)
Dept: FAMILY MEDICINE CLINIC | Age: 78
End: 2018-05-29

## 2018-05-29 VITALS
WEIGHT: 211 LBS | BODY MASS INDEX: 29.22 KG/M2 | SYSTOLIC BLOOD PRESSURE: 124 MMHG | OXYGEN SATURATION: 97 % | DIASTOLIC BLOOD PRESSURE: 68 MMHG | HEART RATE: 70 BPM

## 2018-05-29 DIAGNOSIS — R26.89 BALANCE DISORDER: Primary | ICD-10-CM

## 2018-05-29 DIAGNOSIS — I10 ESSENTIAL HYPERTENSION: ICD-10-CM

## 2018-05-29 DIAGNOSIS — E11.43 TYPE II DIABETES MELLITUS WITH PERIPHERAL AUTONOMIC NEUROPATHY (HCC): ICD-10-CM

## 2018-05-29 DIAGNOSIS — Z85.038 HISTORY OF MALIGNANT NEOPLASM OF LARGE INTESTINE: ICD-10-CM

## 2018-05-29 DIAGNOSIS — E78.5 HYPERLIPIDEMIA, UNSPECIFIED HYPERLIPIDEMIA TYPE: ICD-10-CM

## 2018-05-29 PROCEDURE — 1036F TOBACCO NON-USER: CPT | Performed by: FAMILY MEDICINE

## 2018-05-29 PROCEDURE — 99214 OFFICE O/P EST MOD 30 MIN: CPT | Performed by: FAMILY MEDICINE

## 2018-05-29 PROCEDURE — G8598 ASA/ANTIPLAT THER USED: HCPCS | Performed by: FAMILY MEDICINE

## 2018-05-29 PROCEDURE — G8417 CALC BMI ABV UP PARAM F/U: HCPCS | Performed by: FAMILY MEDICINE

## 2018-05-29 PROCEDURE — 4040F PNEUMOC VAC/ADMIN/RCVD: CPT | Performed by: FAMILY MEDICINE

## 2018-05-29 PROCEDURE — G8427 DOCREV CUR MEDS BY ELIG CLIN: HCPCS | Performed by: FAMILY MEDICINE

## 2018-05-29 PROCEDURE — 1123F ACP DISCUSS/DSCN MKR DOCD: CPT | Performed by: FAMILY MEDICINE

## 2018-05-29 RX ORDER — OMEPRAZOLE 20 MG/1
20 CAPSULE, DELAYED RELEASE ORAL DAILY
Qty: 90 CAPSULE | Refills: 1 | Status: SHIPPED | OUTPATIENT
Start: 2018-05-29 | End: 2018-10-05

## 2018-05-29 ASSESSMENT — ENCOUNTER SYMPTOMS
SHORTNESS OF BREATH: 0
COUGH: 0

## 2018-06-22 DIAGNOSIS — E11.43 TYPE II DIABETES MELLITUS WITH PERIPHERAL AUTONOMIC NEUROPATHY (HCC): ICD-10-CM

## 2018-06-23 LAB
ESTIMATED AVERAGE GLUCOSE: 142.7 MG/DL
HBA1C MFR BLD: 6.6 %

## 2018-06-25 RX ORDER — FOLIC ACID 1 MG/1
TABLET ORAL
Qty: 90 TABLET | Refills: 0 | Status: SHIPPED | OUTPATIENT
Start: 2018-06-25 | End: 2018-08-27 | Stop reason: SDUPTHER

## 2018-06-27 ENCOUNTER — HOSPITAL ENCOUNTER (OUTPATIENT)
Dept: PHYSICAL THERAPY | Age: 78
Discharge: HOME | End: 2018-06-30
Admitting: FAMILY MEDICINE

## 2018-06-27 VITALS — WEIGHT: 207 LBS | BODY MASS INDEX: 28.04 KG/M2 | HEIGHT: 72 IN

## 2018-06-27 NOTE — PROGRESS NOTES
(93.9 kg)  Weight Method: Stated  BSA (Calculated - sq m): 2.18 sq meters  BMI (Calculated): 28.1    Vision/Hearing  Vision  Vision: Impaired (glasses for reading)  Hearing  Hearing: Exceptions to [facility]  Hearing Exceptions: Hard of hearing/hearing concerns; Needs increased volume    Orientation  Orientation  Overall Orientation Status: Within Normal Limits    Social/Functional History  Social/Functional History  Lives With: Spouse  Type of Home: House  Home Layout: One level; Able to Live on Main level with bedroom/bathroom  Home Access: Stairs to enter with rails  Entrance Stairs - Number of Steps: 2  Bathroom Shower/Tub: Walk-in shower  Bathroom Toilet: Standard  Bathroom Equipment: Grab bars in shower  ADL Assistance: Independent  Homemaking Assistance: Independent  Active : Yes  Mode of Transportation: Car  Occupation: Part time employment  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 [employer]  Leisure & Hobbies: reading  Objective     Observation/Palpation  Posture: Fair    AROM RLE (degrees)  RLE General AROM: right hip flexion to 92 deg, hip abd to 25  AROM LLE (degrees)  LLE General AROM: left hip flex to 89 deg, hip abd to 25    Strength RLE  Strength RLE: Exception  R Hip Flexion: 4/5  R Hip Extension: 4/5  R Hip ABduction: 4/5  R Knee Flexion: 5/5  R Knee Extension: 5/5  R Ankle Dorsiflexion: 4+/5  Strength LLE  Strength LLE: Exception  L Hip Flexion: 4-/5  L Hip Extension: 4-/5  L Hip ABduction: 4-/5  L Knee Flexion: 5/5  L Knee Extension: 5/5  L Ankle Dorsiflexion: 4+/5  Tone RLE  RLE Tone: Normotonic  Tone LLE  LLE Tone: Normotonic  Additional Measures  Flexibility: hams in 90/90: L 45, R 32  Special Tests: sit to stand repeat in 30 sec: 7 reps; 6 min walk test: 310 m; Nguyen Balance 39/56     Bed mobility  Supine to Sit: Independent  Sit to Supine: Independent  Transfers  Sit to Stand:  (requires use of hands)  Stand to sit: Modified independent  Ambulation  Ambulation?: Yes  Ambulation 1  Surface: level tile  Device: No Device  Assistance: Independent  Quality of Gait: slow [place] with shuffling pattern, tendency to shuffle feet most noticeable with distances greater than 150 feet, occasional scissoring also increased with fatigue;  slow and careful turns with wide LILIAN  Distance: 1000 feet in clinic today  Balance  Posture: Good (good minus)  Sitting - Static: Good  Sitting - Dynamic: Good  Standing - Static: Good;-  Standing - Dynamic: Good;-  Single Leg Stance R Le  Single Leg Stance L Le     Outpatient fall risk assessment completed asking screening question if patient has fallen in the past 30 days:  [x] Yes  [] No    Based on screen for falls, patient demonstrates fall risk:  [x] Yes  [] No    Interventions based on fall risk status:  Updated Problem List within Medical History  [x] Yes   [] N/A    Asked family to assist with increased observation of the patient  [] Yes   [x] N/A    Patient kept in visible area when not closely supervised by therapist  [x] Yes   [] N/A    Repeatedly reinforce activity limits and safety needs with patient/family  [x] Yes   [] N/A    Increase frequency of rounding/monitoring patient  [x] Yes   [] N/A                             Assessment   Conditions Requiring Skilled Therapeutic Intervention  Body structures, Functions, Activity limitations: Decreased functional mobility ; Decreased ADL status; Decreased ROM; Decreased strength;Decreased balance  Treatment Diagnosis: difficulty walking and fall risk  Prognosis: Good  Decision Making: Low Complexity  Patient Education: POC and goals of physical therapy;  HEP to be distributed at next visit  Barriers to Learning: [place]  REQUIRES PT FOLLOW UP: Yes  Activity Tolerance  Activity Tolerance: Patient Tolerated treatment well         Plan   Plan  Times per week: 2 x week for 6 weeks for 12 total visits  Current Treatment Recommendations: Strengthening, ROM, Balance Training, Functional

## 2018-06-27 NOTE — FLOWSHEET NOTE
Physical Therapy Daily Treatment Note    Date:  2018    Patient Name:  Jesus Putnam    :  1940  MRN: [id number]  Restrictions/Precautions:    Medical/Treatment Diagnosis Information:   · Diagnosis: balance disorder  · Treatment Diagnosis: difficulty walking and fall risk    Tracking Information:  Physician Information Referring Practitioner: Dr. Mateo Jackson of Care Sent Date:  Signed Received:    Visit Count / Total Visits      Insurance Approved Visits  /  Approved Dates:     Insurance Information PT Insurance Information: [employer] 100% coinsur, med necess, $500 deduct    Progress Note/G-codes   [x]  Yes  []  No Next Due:      Pain level: 0/10    Subjective:  See eval    Objective:  Observation:   Test measurements:      Exercises:  Exercise/Equipment Resistance/Repetitions Other comments                                                                            Other Therapeutic Activities:  : POC and goals of PT discussed with the patient at length and patient verbally agreed to both.   Discussed fall prevention in bathroom and in tight spaces with emphasis on wide LILIAN for turning    Home Exercise Program:  : to be distributed at next visit    Manual Treatments:      Modalities:      Timed Code Treatment Minutes:  45    Total Treatment Minutes:   60    Treatment/Activity Tolerance:  [x] Patient tolerated treatment well [] Patient limited by fatigue  [] Patient limited by pain  [] Patient limited by other medical complications  [] Other:     Prognosis: [x] Good [] Fair  [] Poor    Patient Requires Follow-up: [x] Yes  [] No    Plan:   [] Continue per plan of care [] Alter current plan (see comments)  [x] Plan of care initiated [] Hold pending MD visit [] Discharge  Plan for Next Session:  Flexibility, strengthening and balance exercises    Electronically signed by:  Teodora Starr

## 2018-07-01 ENCOUNTER — HOSPITAL ENCOUNTER (OUTPATIENT)
Dept: OTHER | Age: 78
Discharge: HOME | End: 2018-07-31
Attending: FAMILY MEDICINE | Admitting: FAMILY MEDICINE

## 2018-07-11 ENCOUNTER — OFFICE VISIT (OUTPATIENT)
Dept: FAMILY MEDICINE CLINIC | Age: 78
End: 2018-07-11

## 2018-07-11 VITALS
HEART RATE: 78 BPM | DIASTOLIC BLOOD PRESSURE: 82 MMHG | WEIGHT: 206.8 LBS | HEIGHT: 72 IN | BODY MASS INDEX: 28.01 KG/M2 | SYSTOLIC BLOOD PRESSURE: 130 MMHG | OXYGEN SATURATION: 98 %

## 2018-07-11 DIAGNOSIS — E61.1 IRON DEFICIENCY: ICD-10-CM

## 2018-07-11 DIAGNOSIS — I10 ESSENTIAL HYPERTENSION: ICD-10-CM

## 2018-07-11 DIAGNOSIS — E11.43 TYPE II DIABETES MELLITUS WITH PERIPHERAL AUTONOMIC NEUROPATHY (HCC): Primary | ICD-10-CM

## 2018-07-11 DIAGNOSIS — E78.5 HYPERLIPIDEMIA, UNSPECIFIED HYPERLIPIDEMIA TYPE: ICD-10-CM

## 2018-07-11 PROCEDURE — 4040F PNEUMOC VAC/ADMIN/RCVD: CPT | Performed by: FAMILY MEDICINE

## 2018-07-11 PROCEDURE — G8598 ASA/ANTIPLAT THER USED: HCPCS | Performed by: FAMILY MEDICINE

## 2018-07-11 PROCEDURE — 1123F ACP DISCUSS/DSCN MKR DOCD: CPT | Performed by: FAMILY MEDICINE

## 2018-07-11 PROCEDURE — 1101F PT FALLS ASSESS-DOCD LE1/YR: CPT | Performed by: FAMILY MEDICINE

## 2018-07-11 PROCEDURE — 1036F TOBACCO NON-USER: CPT | Performed by: FAMILY MEDICINE

## 2018-07-11 PROCEDURE — G8427 DOCREV CUR MEDS BY ELIG CLIN: HCPCS | Performed by: FAMILY MEDICINE

## 2018-07-11 PROCEDURE — G8417 CALC BMI ABV UP PARAM F/U: HCPCS | Performed by: FAMILY MEDICINE

## 2018-07-11 PROCEDURE — 99213 OFFICE O/P EST LOW 20 MIN: CPT | Performed by: FAMILY MEDICINE

## 2018-07-11 RX ORDER — GLIMEPIRIDE 4 MG/1
2 TABLET ORAL
Qty: 180 TABLET | Refills: 3
Start: 2018-07-11 | End: 2018-09-25 | Stop reason: SDUPTHER

## 2018-07-11 ASSESSMENT — ENCOUNTER SYMPTOMS
SHORTNESS OF BREATH: 0
CHEST TIGHTNESS: 0

## 2018-08-01 ENCOUNTER — HOSPITAL ENCOUNTER (OUTPATIENT)
Dept: OTHER | Age: 78
Discharge: HOME | End: 2018-08-31
Attending: FAMILY MEDICINE | Admitting: FAMILY MEDICINE

## 2018-08-23 ENCOUNTER — OFFICE VISIT (OUTPATIENT)
Dept: DERMATOLOGY | Age: 78
End: 2018-08-23

## 2018-08-23 DIAGNOSIS — L57.0 AK (ACTINIC KERATOSIS): Primary | ICD-10-CM

## 2018-08-23 PROCEDURE — 17004 DESTROY PREMAL LESIONS 15/>: CPT | Performed by: DERMATOLOGY

## 2018-08-23 NOTE — PROGRESS NOTES
[facility]  Kg Burden MD  [address]  1940    [de-identified] y.o. male     Date of Visit: 8/23/2018    Chief Complaint: skin lesions    History of Present Illness:    He returns today to follow-up for history of actinic keratosescomplains of multiple scaly lesions on the scalp today. He was treated in the past with both cryotherapy and Efudex. Derm History:  Nodular BCC of the right upper foreheadtreated with Mohs by Dr. Magdalena Brumfield on 8/3/2016. Superficial BCC of the right superior lateral legtreated with curettage on 4/14/2016. Review of Systems:  Skin: No new or changing moles. Past Medical History, Family History, Surgical History, Medications and Allergies reviewed. Past Medical History:   Diagnosis Date    Abnormalities of the hair     Allergic rhinitis, cause unspecified     Arthritis     CAD (coronary artery disease)     Cancer ([address] 3/2009    Colon cancer and resection    Degeneration of cervical intervertebral disc     Diabetes mellitus ([address]     Heart attack ([address] 4/2010    Hyperlipidemia     Hypertension     Hypertrophy of prostate without urinary obstruction and other lower urinary tract symptoms (LUTS)     Nephrolithiasis     Osteoarthrosis, unspecified whether generalized or localized, unspecified site     Personal history of malignant neoplasm of large intestine     Unspecified hereditary and idiopathic peripheral neuropathy     Unspecified sleep apnea      Past Surgical History:   Procedure Laterality Date    CARDIAC SURGERY  4/9/2010    CABG x 2    CHOLECYSTECTOMY  7/31/2009    COLON SURGERY      MOHS SURGERY      OTHER SURGICAL HISTORY  2013    removal of portacath    TOTAL HIP ARTHROPLASTY  6/1/2012       Allergies   Allergen Reactions    Cardizem [Diltiazem Hcl]      bradycardia    Celebrex [Celecoxib] Other (See Comments)     Ineffective.      Darvon [Propoxyphene Hcl]     Percocet [Oxycodone-Acetaminophen]  Procardia [Nifedipine]      edema     Outpatient Prescriptions Marked as Taking for the 8/23/18 encounter (Office Visit) with Saskia Saavedra MD   Medication Sig Dispense Refill    glimepiride (AMARYL) 4 MG tablet Take 0.5 tablets by mouth every morning (before breakfast) 920 tablet 3    folic acid (FOLVITE) 1 MG tablet TAKE 1 TABLET EVERY DAY (SUBSTITUTED FOR FOLVITE) 90 tablet 0    omeprazole (PRILOSEC) 20 MG delayed release capsule Take 1 capsule by mouth daily 90 capsule 1    metFORMIN (GLUCOPHAGE) 1000 MG tablet TAKE 1/2 TABLET EVERY MORNING  AND TAKE 1 TABLET EVERY EVENING 135 tablet 0    nitroGLYCERIN (NITROSTAT) 0.4 MG SL tablet Place 1 tablet under the tongue every 5 minutes as needed for Chest pain up to max of 3 total doses. If no relief after 1 dose, call 911. 25 tablet 3    clopidogrel (PLAVIX) 75 MG tablet TAKE 1 TABLET EVERY DAY 90 tablet 3    fluorouracil (EFUDEX) 5 % cream Apply twice daily to the top of the scalp twice per day for 14 days. 40 g 0    ONE TOUCH ULTRA TEST strip TEST BLOOD SUGAR ONCE A DAYS AS NEEDED 100 strip 2    Cyanocobalamin (VITAMIN B12 PO) Take by mouth      Iron Combinations (IRON COMPLEX PO) Take by mouth      pravastatin (PRAVACHOL) 40 MG tablet Take 40 mg by mouth daily.  ONE TOUCH LANCETS MISC 1 each by Does not apply route daily as needed. 100 each 3    Multiple Vitamins-Minerals (THERAPEUTIC MULTIVITAMIN-MINERALS) tablet Take 1 tablet by mouth daily.  carvedilol (COREG) 12.5 MG tablet Take 12.5 mg by mouth 2 times daily (with meals).  Cholecalciferol (VITAMIN D-3) 1000 UNITS CAPS Take 1 capsule by mouth daily.  acetaminophen (TYLENOL) 500 MG tablet Take 500 mg by mouth every 6 hours as needed for Pain.  loperamide (IMODIUM) 2 MG capsule Take 2 mg by mouth as needed for Diarrhea.  Blood Glucose Calibration (ONETOUCH ULTRA CONTROL VI) by In Vitro route.  spironolactone (ALDACTONE) 25 MG tablet Take 25 mg by mouth daily.  tamsulosin (FLOMAX) 0.4 MG capsule Take 0.4 mg by mouth daily.  aspirin 81 MG tablet Take 81 mg by mouth daily.  Ascorbic Acid (VITAMIN C) 250 MG tablet Take 500 mg by mouth daily. Physical Examination       The following were examined and determined to be normal: Psych/Neuro, Conjunctivae/eyelids, Gums/teeth/lips, Neck, Breast/axilla/chest, Abdomen, Back, RUE, LUE, RLE, LLE and Nails/digits. The following were examined and determined to be abnormal: Scalp/hair and Head/face. Well appearing. 1.  Vertex scalp - 8, right brow - 1, right cheek - 2, left medial lower cheek - 1, left temple - 3: ill defined irreg shaped keratotic pink macules. Assessment and Plan     1. AK (actinic keratosis) -     2 cycles of liquid nitrogen applied to 15 AKs: Vertex scalp - 8, right brow - 1, right cheek - 2, left medial lower cheek - 1, left temple - 3. Patient was educated regarding the potential risks of blister formation, discomfort, hypopigmentation, and scar. Wound care was discussed. Continue sun protective behaviors. Return in about 7 months (around 3/23/2019).

## 2018-08-28 RX ORDER — FOLIC ACID 1 MG/1
TABLET ORAL
Qty: 90 TABLET | Refills: 0 | Status: SHIPPED | OUTPATIENT
Start: 2018-08-28 | End: 2018-11-08 | Stop reason: SDUPTHER

## 2018-09-01 ENCOUNTER — HOSPITAL ENCOUNTER (OUTPATIENT)
Dept: OTHER | Age: 78
Discharge: HOME | End: 2018-09-01
Attending: FAMILY MEDICINE | Admitting: FAMILY MEDICINE

## 2018-09-21 ENCOUNTER — TELEPHONE (OUTPATIENT)
Dept: FAMILY MEDICINE CLINIC | Age: 78
End: 2018-09-21

## 2018-09-24 DIAGNOSIS — E11.43 TYPE II DIABETES MELLITUS WITH PERIPHERAL AUTONOMIC NEUROPATHY (HCC): ICD-10-CM

## 2018-09-24 DIAGNOSIS — E61.1 IRON DEFICIENCY: ICD-10-CM

## 2018-09-24 DIAGNOSIS — I10 ESSENTIAL HYPERTENSION: ICD-10-CM

## 2018-09-24 DIAGNOSIS — E78.5 HYPERLIPIDEMIA, UNSPECIFIED HYPERLIPIDEMIA TYPE: ICD-10-CM

## 2018-09-24 LAB
A/G RATIO: 1.5 (ref 1.1–2.2)
ALBUMIN SERPL-MCNC: 4.2 G/DL (ref 3.4–5)
ALP BLD-CCNC: 74 U/L (ref 40–129)
ALT SERPL-CCNC: 16 U/L (ref 10–40)
ANION GAP SERPL CALCULATED.3IONS-SCNC: 16 MMOL/L (ref 3–16)
AST SERPL-CCNC: 20 U/L (ref 15–37)
BASOPHILS ABSOLUTE: 0 K/UL (ref 0–0.2)
BASOPHILS RELATIVE PERCENT: 0.6 %
BILIRUB SERPL-MCNC: 0.4 MG/DL (ref 0–1)
BUN BLDV-MCNC: 27 MG/DL (ref 7–20)
CALCIUM SERPL-MCNC: 9.2 MG/DL (ref 8.3–10.6)
CHLORIDE BLD-SCNC: 104 MMOL/L (ref 99–110)
CHOLESTEROL, TOTAL: 102 MG/DL (ref 0–199)
CO2: 22 MMOL/L (ref 21–32)
CREAT SERPL-MCNC: 1.4 MG/DL (ref 0.8–1.3)
EOSINOPHILS ABSOLUTE: 0.3 K/UL (ref 0–0.6)
EOSINOPHILS RELATIVE PERCENT: 4.1 %
GFR AFRICAN AMERICAN: 59
GFR NON-AFRICAN AMERICAN: 49
GLOBULIN: 2.8 G/DL
GLUCOSE BLD-MCNC: 140 MG/DL (ref 70–99)
HCT VFR BLD CALC: 41 % (ref 40.5–52.5)
HDLC SERPL-MCNC: 38 MG/DL (ref 40–60)
HEMOGLOBIN: 13.5 G/DL (ref 13.5–17.5)
LDL CHOLESTEROL CALCULATED: 33 MG/DL
LYMPHOCYTES ABSOLUTE: 2.7 K/UL (ref 1–5.1)
LYMPHOCYTES RELATIVE PERCENT: 33.9 %
MCH RBC QN AUTO: 30.2 PG (ref 26–34)
MCHC RBC AUTO-ENTMCNC: 32.9 G/DL (ref 31–36)
MCV RBC AUTO: 91.8 FL (ref 80–100)
MONOCYTES ABSOLUTE: 1 K/UL (ref 0–1.3)
MONOCYTES RELATIVE PERCENT: 12.1 %
NEUTROPHILS ABSOLUTE: 4 K/UL (ref 1.7–7.7)
NEUTROPHILS RELATIVE PERCENT: 49.3 %
PDW BLD-RTO: 13.4 % (ref 12.4–15.4)
PLATELET # BLD: 177 K/UL (ref 135–450)
PMV BLD AUTO: 9.7 FL (ref 5–10.5)
POTASSIUM SERPL-SCNC: 4.6 MMOL/L (ref 3.5–5.1)
RBC # BLD: 4.47 M/UL (ref 4.2–5.9)
SODIUM BLD-SCNC: 142 MMOL/L (ref 136–145)
TOTAL PROTEIN: 7 G/DL (ref 6.4–8.2)
TRIGL SERPL-MCNC: 154 MG/DL (ref 0–150)
VLDLC SERPL CALC-MCNC: 31 MG/DL
WBC # BLD: 8.1 K/UL (ref 4–11)

## 2018-09-24 NOTE — TELEPHONE ENCOUNTER
Called patient and he states he is taking 2 mg in the morning and at night. Last week he checked his BS three times during the week.     9/21/18 - 127 - Fasting  9/19/18 - 119 - Fasting  9/21/18 - 130 - Fasting    Scanned July BS readings into his chart

## 2018-09-25 LAB
ESTIMATED AVERAGE GLUCOSE: 145.6 MG/DL
HBA1C MFR BLD: 6.7 %

## 2018-09-25 RX ORDER — GLIMEPIRIDE 4 MG/1
2 TABLET ORAL 2 TIMES DAILY
Qty: 90 TABLET | Refills: 1
Start: 2018-09-25 | End: 2021-03-01

## 2018-10-05 ENCOUNTER — OFFICE VISIT (OUTPATIENT)
Dept: FAMILY MEDICINE CLINIC | Age: 78
End: 2018-10-05
Payer: MEDICARE

## 2018-10-05 VITALS
WEIGHT: 207 LBS | DIASTOLIC BLOOD PRESSURE: 74 MMHG | SYSTOLIC BLOOD PRESSURE: 132 MMHG | HEART RATE: 71 BPM | BODY MASS INDEX: 28.07 KG/M2 | OXYGEN SATURATION: 98 %

## 2018-10-05 DIAGNOSIS — I10 ESSENTIAL HYPERTENSION: ICD-10-CM

## 2018-10-05 DIAGNOSIS — E11.43 TYPE II DIABETES MELLITUS WITH PERIPHERAL AUTONOMIC NEUROPATHY (HCC): Primary | ICD-10-CM

## 2018-10-05 DIAGNOSIS — H35.30 MACULAR DEGENERATION OF RIGHT EYE, UNSPECIFIED TYPE: ICD-10-CM

## 2018-10-05 DIAGNOSIS — E78.5 HYPERLIPIDEMIA, UNSPECIFIED HYPERLIPIDEMIA TYPE: ICD-10-CM

## 2018-10-05 DIAGNOSIS — R79.89 ELEVATED SERUM CREATININE: ICD-10-CM

## 2018-10-05 DIAGNOSIS — G47.33 OBSTRUCTIVE SLEEP APNEA SYNDROME: ICD-10-CM

## 2018-10-05 DIAGNOSIS — D69.2 SENILE PURPURA (HCC): ICD-10-CM

## 2018-10-05 DIAGNOSIS — R19.7 DIARRHEA, UNSPECIFIED TYPE: ICD-10-CM

## 2018-10-05 PROBLEM — T14.8XXA BRUISING: Status: RESOLVED | Noted: 2017-07-14 | Resolved: 2018-10-05

## 2018-10-05 PROCEDURE — G8598 ASA/ANTIPLAT THER USED: HCPCS | Performed by: FAMILY MEDICINE

## 2018-10-05 PROCEDURE — 1101F PT FALLS ASSESS-DOCD LE1/YR: CPT | Performed by: FAMILY MEDICINE

## 2018-10-05 PROCEDURE — 4040F PNEUMOC VAC/ADMIN/RCVD: CPT | Performed by: FAMILY MEDICINE

## 2018-10-05 PROCEDURE — G8427 DOCREV CUR MEDS BY ELIG CLIN: HCPCS | Performed by: FAMILY MEDICINE

## 2018-10-05 PROCEDURE — 99214 OFFICE O/P EST MOD 30 MIN: CPT | Performed by: FAMILY MEDICINE

## 2018-10-05 PROCEDURE — G8484 FLU IMMUNIZE NO ADMIN: HCPCS | Performed by: FAMILY MEDICINE

## 2018-10-05 PROCEDURE — 1036F TOBACCO NON-USER: CPT | Performed by: FAMILY MEDICINE

## 2018-10-05 PROCEDURE — G8417 CALC BMI ABV UP PARAM F/U: HCPCS | Performed by: FAMILY MEDICINE

## 2018-10-05 PROCEDURE — 1123F ACP DISCUSS/DSCN MKR DOCD: CPT | Performed by: FAMILY MEDICINE

## 2018-10-05 NOTE — PROGRESS NOTES
Chencho José  : 1940  Encounter date: 10/5/2018    This is a [de-identified] y.o. male who presents for a chronic condition visit. Chief Complaint   Patient presents with    3 Month Follow-Up       Would like cologuard and wondering about how to get this completed. Is scheduled with endocrinologist next month. Has worried about his sugars elevating since I had recommended decreasing the glimepiride. A1C has increased from 6 to 6.7 which he worries is too high. Has what he refers to as \"strong days\" and ones that are not as strong in terms of how he feels overall. He has recently decided to retire as . Omeprazole: OTC is much more expensive than prescription. Wondering about getting rx for this. Bowel issues still hit him at random. Takes 4-6 imodium/week if he feels queezy stomach/diarrhea. Bruising on arms: just wondering if there is anything to prevent this. Did well with therapy. Enjoyed this. Feels that it helped him. Hearing is going. Wondering about getting ears checked. Used to have issues with cerumen impaction. Not interested in following up with audiology at this time. Diabetes Mellitus Type 2: Current symptoms/problems include none. Medication compliance:  compliant all of the time  Diabetic diet compliance:  compliant most of the time,  Weight trend: stable  Current exercise: no regular exercise  Barriers: none    Home blood sugar records: fasting sugars in the 110-140 range; no sugars over 160. Recent log to be scanned into chart. Any episodes of hypoglycemia? no  Eye exam current (within one year): yes   reports that he has never smoked. He has never used smokeless tobacco.   Daily Aspirin? Yes (and plavix)    Hypertension:  Home blood pressure monitoring: Yes - similar to today's in office reading. He is adherent to a low sodium diet. Patient denies chest pain, shortness of breath, peripheral edema and palpitations.   Antihypertensive medication side

## 2018-10-06 ASSESSMENT — ENCOUNTER SYMPTOMS
SHORTNESS OF BREATH: 0
COUGH: 0

## 2018-11-14 RX ORDER — FOLIC ACID 1 MG/1
TABLET ORAL
Qty: 90 TABLET | Refills: 0 | Status: SHIPPED | OUTPATIENT
Start: 2018-11-14

## 2019-03-25 ENCOUNTER — OFFICE VISIT (OUTPATIENT)
Dept: DERMATOLOGY | Age: 79
End: 2019-03-25
Payer: MEDICARE

## 2019-03-25 DIAGNOSIS — L82.0 INFLAMED SEBORRHEIC KERATOSIS: ICD-10-CM

## 2019-03-25 DIAGNOSIS — L57.0 ACTINIC KERATOSIS: Primary | ICD-10-CM

## 2019-03-25 DIAGNOSIS — L84 CALLUS OF FOOT: ICD-10-CM

## 2019-03-25 PROCEDURE — 17000 DESTRUCT PREMALG LESION: CPT | Performed by: DERMATOLOGY

## 2019-03-25 PROCEDURE — G8598 ASA/ANTIPLAT THER USED: HCPCS | Performed by: DERMATOLOGY

## 2019-03-25 PROCEDURE — G8417 CALC BMI ABV UP PARAM F/U: HCPCS | Performed by: DERMATOLOGY

## 2019-03-25 PROCEDURE — 4040F PNEUMOC VAC/ADMIN/RCVD: CPT | Performed by: DERMATOLOGY

## 2019-03-25 PROCEDURE — 99213 OFFICE O/P EST LOW 20 MIN: CPT | Performed by: DERMATOLOGY

## 2019-03-25 PROCEDURE — 1101F PT FALLS ASSESS-DOCD LE1/YR: CPT | Performed by: DERMATOLOGY

## 2019-03-25 PROCEDURE — 1123F ACP DISCUSS/DSCN MKR DOCD: CPT | Performed by: DERMATOLOGY

## 2019-03-25 PROCEDURE — 1036F TOBACCO NON-USER: CPT | Performed by: DERMATOLOGY

## 2019-03-25 PROCEDURE — G8484 FLU IMMUNIZE NO ADMIN: HCPCS | Performed by: DERMATOLOGY

## 2019-03-25 PROCEDURE — 17003 DESTRUCT PREMALG LES 2-14: CPT | Performed by: DERMATOLOGY

## 2019-03-25 PROCEDURE — G8427 DOCREV CUR MEDS BY ELIG CLIN: HCPCS | Performed by: DERMATOLOGY

## 2019-03-25 PROCEDURE — 17110 DESTRUCTION B9 LES UP TO 14: CPT | Performed by: DERMATOLOGY

## 2019-04-25 ENCOUNTER — TELEPHONE (OUTPATIENT)
Dept: DERMATOLOGY | Age: 79
End: 2019-04-25

## 2019-04-25 NOTE — TELEPHONE ENCOUNTER
Patient called and Dr. Kady Laura had given  him two names of podiatrist.  He lost his papers and would like for us to call him back with those names. Thanks!     Call back# [phone]

## 2019-04-26 NOTE — TELEPHONE ENCOUNTER
Called and provided pt with the name of  the podiatrist Dr. Merlinda Bevels suggested.     Call complete

## 2019-08-26 ENCOUNTER — OFFICE VISIT (OUTPATIENT)
Dept: DERMATOLOGY | Age: 79
End: 2019-08-26
Payer: MEDICARE

## 2019-08-26 DIAGNOSIS — L57.0 AK (ACTINIC KERATOSIS): Primary | ICD-10-CM

## 2019-08-26 DIAGNOSIS — D69.2 SOLAR PURPURA (HCC): ICD-10-CM

## 2019-08-26 DIAGNOSIS — L82.1 SK (SEBORRHEIC KERATOSIS): ICD-10-CM

## 2019-08-26 DIAGNOSIS — D48.5 NEOPLASM OF UNCERTAIN BEHAVIOR OF SKIN: ICD-10-CM

## 2019-08-26 PROCEDURE — 4040F PNEUMOC VAC/ADMIN/RCVD: CPT | Performed by: DERMATOLOGY

## 2019-08-26 PROCEDURE — G8598 ASA/ANTIPLAT THER USED: HCPCS | Performed by: DERMATOLOGY

## 2019-08-26 PROCEDURE — 1123F ACP DISCUSS/DSCN MKR DOCD: CPT | Performed by: DERMATOLOGY

## 2019-08-26 PROCEDURE — 11102 TANGNTL BX SKIN SINGLE LES: CPT | Performed by: DERMATOLOGY

## 2019-08-26 PROCEDURE — 17004 DESTROY PREMAL LESIONS 15/>: CPT | Performed by: DERMATOLOGY

## 2019-08-26 PROCEDURE — 99213 OFFICE O/P EST LOW 20 MIN: CPT | Performed by: DERMATOLOGY

## 2019-08-26 PROCEDURE — 1036F TOBACCO NON-USER: CPT | Performed by: DERMATOLOGY

## 2019-08-26 PROCEDURE — G8417 CALC BMI ABV UP PARAM F/U: HCPCS | Performed by: DERMATOLOGY

## 2019-08-26 PROCEDURE — G8427 DOCREV CUR MEDS BY ELIG CLIN: HCPCS | Performed by: DERMATOLOGY

## 2019-08-28 LAB — DERMATOLOGY PATHOLOGY REPORT: ABNORMAL

## 2019-09-06 ENCOUNTER — PROCEDURE VISIT (OUTPATIENT)
Dept: DERMATOLOGY | Age: 79
End: 2019-09-06
Payer: MEDICARE

## 2019-09-06 DIAGNOSIS — C44.622 SCC (SQUAMOUS CELL CARCINOMA), ARM, RIGHT: Primary | ICD-10-CM

## 2019-09-06 PROCEDURE — 17261 DSTRJ MAL LES T/A/L .6-1.0CM: CPT | Performed by: DERMATOLOGY

## 2019-09-06 NOTE — PROGRESS NOTES
[facility]  Jonelle Villalba MD  [address] [facility]  1940    78 y.o. male     Date of Visit: 9/6/2019    Chief Complaint: SCC    History of Present Illness:    Here today for treatment of a small SCC on the right forearm. RESULTS   DIAGNOSIS   DIAGNOSIS:   RIGHT FOREARM-   Superficial squamous cell carcinoma, moderately   differentiated   There are atypical keratinocytes within the basal cell   layer.  Focally the atypical keratinocytes with moderate   pleomorphism are proliferating downward into the   superficial dermis producing a pattern of early squamous   cell carcinoma. Rex Carvajal MD       Review of Systems:  None. Past Medical History, Family History, Surgical History, Medications and Allergies reviewed. Past Medical History:   Diagnosis Date    Abnormalities of the hair     Allergic rhinitis, cause unspecified     Arthritis     CAD (coronary artery disease)     Cancer ([facility] 3/2009    Colon cancer and resection    Degeneration of cervical intervertebral disc     Diabetes mellitus ([facility]     Heart attack ([facility] 4/2010    Hyperlipidemia     Hypertension     Hypertrophy of prostate without urinary obstruction and other lower urinary tract symptoms (LUTS)     Nephrolithiasis     Osteoarthrosis, unspecified whether generalized or localized, unspecified site     Personal history of malignant neoplasm of large intestine     Unspecified hereditary and idiopathic peripheral neuropathy     Unspecified sleep apnea      Past Surgical History:   Procedure Laterality Date    CARDIAC SURGERY  4/9/2010    CABG x 2    CHOLECYSTECTOMY  7/31/2009    COLON SURGERY      MOHS SURGERY      OTHER SURGICAL HISTORY  2013    removal of portacath    TOTAL HIP ARTHROPLASTY  6/1/2012       Allergies   Allergen Reactions    Cardizem [Diltiazem Hcl]      bradycardia    Celebrex [Celecoxib] Other (See Comments)     Ineffective.     

## 2020-03-23 ENCOUNTER — TELEPHONE (OUTPATIENT)
Dept: ENT CLINIC | Age: 80
End: 2020-03-23

## 2020-03-23 NOTE — TELEPHONE ENCOUNTER
Patient called. He said he is a Dr. Jhonny Quiles patient, he saw his PCP last week and was treated with Antibiotic for Ear infection. He was recommended that he see his ENT if not better. I told the patient that I would route a message and see if I can put him on as a patient for Dr. Jeremiah Kilpatrick if urgent. Please advise.

## 2020-03-23 NOTE — TELEPHONE ENCOUNTER
Patient called office and states Dr Gino Lubin office sent him to us. Patient states that he has had three rounds of antibiotics and has swelling near ear, denies fever, having a lot of pain. States cannot hear when drops are going in ear. Patient stressed he has not used his cpap for two weeks and \"normally gets moisture up there\"  Explained to patient the CDC guidelines and risk with COVID guidelines and patient then asked what was he to do. I suggested to patient to call pcp back for pain control medicine. Will forward message to Dr Ghulam Briggs.

## 2020-03-23 NOTE — TELEPHONE ENCOUNTER
He should see Dr. Faisal Barnes if he has continued earache, or hearing loss, or drainage.   If none of the above are happeeing, he needs to consider the risk of coming in given his age

## 2020-03-23 NOTE — TELEPHONE ENCOUNTER
Patient advised of Dr. Jacklyn Fairbanks recommendations. He said he is having the symptoms mentioned. Patient was given the phone number to contact [facility] for appointment.

## 2020-03-24 ENCOUNTER — OFFICE VISIT (OUTPATIENT)
Dept: ENT CLINIC | Age: 80
End: 2020-03-24
Payer: MEDICARE

## 2020-03-24 VITALS
DIASTOLIC BLOOD PRESSURE: 68 MMHG | BODY MASS INDEX: 27.22 KG/M2 | SYSTOLIC BLOOD PRESSURE: 134 MMHG | WEIGHT: 201 LBS | HEIGHT: 72 IN | HEART RATE: 63 BPM

## 2020-03-24 PROCEDURE — G8427 DOCREV CUR MEDS BY ELIG CLIN: HCPCS | Performed by: OTOLARYNGOLOGY

## 2020-03-24 PROCEDURE — G8484 FLU IMMUNIZE NO ADMIN: HCPCS | Performed by: OTOLARYNGOLOGY

## 2020-03-24 PROCEDURE — G8417 CALC BMI ABV UP PARAM F/U: HCPCS | Performed by: OTOLARYNGOLOGY

## 2020-03-24 PROCEDURE — 1123F ACP DISCUSS/DSCN MKR DOCD: CPT | Performed by: OTOLARYNGOLOGY

## 2020-03-24 PROCEDURE — 4040F PNEUMOC VAC/ADMIN/RCVD: CPT | Performed by: OTOLARYNGOLOGY

## 2020-03-24 PROCEDURE — 4130F TOPICAL PREP RX AOE: CPT | Performed by: OTOLARYNGOLOGY

## 2020-03-24 PROCEDURE — 92504 EAR MICROSCOPY EXAMINATION: CPT | Performed by: OTOLARYNGOLOGY

## 2020-03-24 PROCEDURE — 99203 OFFICE O/P NEW LOW 30 MIN: CPT | Performed by: OTOLARYNGOLOGY

## 2020-03-24 PROCEDURE — 1036F TOBACCO NON-USER: CPT | Performed by: OTOLARYNGOLOGY

## 2020-03-24 ASSESSMENT — ENCOUNTER SYMPTOMS
BACK PAIN: 0
CONSTIPATION: 0
RHINORRHEA: 0
VOMITING: 0
BLOOD IN STOOL: 0
SHORTNESS OF BREATH: 0
SORE THROAT: 0
WHEEZING: 0
CHOKING: 0
STRIDOR: 0
DIARRHEA: 0
EYE ITCHING: 0
TROUBLE SWALLOWING: 0
COUGH: 0
FACIAL SWELLING: 0
SINUS PRESSURE: 0
VOICE CHANGE: 0
NAUSEA: 0
SINUS PAIN: 0
EYE DISCHARGE: 0
PHOTOPHOBIA: 0
COLOR CHANGE: 0

## 2020-03-24 NOTE — PROGRESS NOTES
[facility]  [facility] [place], [address]  P: [phone]  F: [phone]       Patient     Jennifer Hall  1940    ChiefComplaint     Chief Complaint   Patient presents with    Ear Problem     Patient is here today because his right ear is popping and crackling, with pain and pressure, onset past 2 weeks       History of Present Illness     Jennifre Hall is a pleasant 78 y.o. male who presents as a referral for right otitis externa. He was having right-sided ear pain and otorrhea. He saw his PCP on March 16. At that time he was noted to have purulent otorrhea from the right ear canal as well as some edema and swelling of the canal.  He was placed on Ciprodex drops 3 times daily for 7 days. He was then placed on cortisporin drops. He continues to have ear pain and otorrhea. Endorses hearing loss in right. Admits to mild dizziness. Patient is diabetic, non-insulin-dependent. Sugars well controlled typically not above 170. Most recent A1c in the 7 range.     Past Medical History     Past Medical History:   Diagnosis Date    Abnormalities of the hair     Allergic rhinitis, cause unspecified     Arthritis     CAD (coronary artery disease)     Cancer ([address] 3/2009    Colon cancer and resection    Degeneration of cervical intervertebral disc     Diabetes mellitus ([address]     Heart attack ([address] 4/2010    Hyperlipidemia     Hypertension     Hypertrophy of prostate without urinary obstruction and other lower urinary tract symptoms (LUTS)     Nephrolithiasis     Osteoarthrosis, unspecified whether generalized or localized, unspecified site     Personal history of malignant neoplasm of large intestine     Unspecified hereditary and idiopathic peripheral neuropathy     Unspecified sleep apnea        Past Surgical History     Past Surgical History:   Procedure Laterality Date    CARDIAC SURGERY  4/9/2010    CABG x 2    CHOLECYSTECTOMY  7/31/2009    COLON SURGERY      MOHS SURGERY      OTHER SURGICAL HISTORY  2013    removal of portacath    TOTAL HIP ARTHROPLASTY  6/1/2012       Family History     Family History   Problem Relation Age of Onset    Stroke Other     Diabetes Other     High Blood Pressure Other        Social History     Social History     Socioeconomic History    Marital status:      Spouse name: Not on file    Number of children: Not on file    Years of education: Not on file    Highest education level: Not on file   Occupational History    Not on file   Social Needs    Financial resource strain: Not on file    Food insecurity     Worry: Not on file     Inability: Not on file    Transportation needs     Medical: Not on file     Non-medical: Not on file   Tobacco Use    Smoking status: Never Smoker    Smokeless tobacco: Never Used   Substance and Sexual Activity    Alcohol use: Yes     Comment: rarely    Drug use: No    Sexual activity: Not on file   Lifestyle    Physical activity     Days per week: Not on file     Minutes per session: Not on file    Stress: Not on file   Relationships    Social connections     Talks on phone: Not on file     Gets together: Not on file     Attends Bahai service: Not on file     Active member of club or organization: Not on file     Attends meetings of clubs or organizations: Not on file     Relationship status: Not on file    Intimate partner violence     Fear of current or ex partner: Not on file     Emotionally abused: Not on file     Physically abused: Not on file     Forced sexual activity: Not on file   Other Topics Concern    Not on file   Social History Narrative    Not on file       Allergies     Allergies   Allergen Reactions    Cardizem [Diltiazem Hcl]      bradycardia    Celebrex [Celecoxib] Other (See Comments)     Ineffective.      Darvon [Propoxyphene Hcl]     Percocet [Oxycodone-Acetaminophen]     Procardia [Nifedipine]      edema       Medications     Current Outpatient Medications   Medication Sig Dispense Refill    neomycin-polymyxin-hydrocortisone (CORTISPORIN) 3.5-86859-0 otic solution Place 4 drops into the right ear 3 times daily for 14 days 1 Bottle 1    ONE TOUCH ULTRA TEST strip TEST BLOOD SUGAR ONCE A DAY AS NEEDED 890 strip 1    folic acid (FOLVITE) 1 MG tablet TAKE 1 TABLET EVERY DAY (SUBSTITUTED FOR FOLVITE) 90 tablet 0    clopidogrel (PLAVIX) 75 MG tablet TAKE 1 TABLET EVERY DAY 90 tablet 3    glimepiride (AMARYL) 4 MG tablet Take 0.5 tablets by mouth 2 times daily 90 tablet 1    metFORMIN (GLUCOPHAGE) 1000 MG tablet TAKE 1/2 TABLET EVERY MORNING  AND TAKE 1 TABLET EVERY EVENING 135 tablet 0    nitroGLYCERIN (NITROSTAT) 0.4 MG SL tablet Place 1 tablet under the tongue every 5 minutes as needed for Chest pain up to max of 3 total doses. If no relief after 1 dose, call 911. 25 tablet 3    fluorouracil (EFUDEX) 5 % cream Apply twice daily to the top of the scalp twice per day for 14 days. 40 g 0    Cyanocobalamin (VITAMIN B12 PO) Take by mouth      Iron Combinations (IRON COMPLEX PO) Take by mouth      pravastatin (PRAVACHOL) 40 MG tablet Take 40 mg by mouth daily.  ONE TOUCH LANCETS MISC 1 each by Does not apply route daily as needed. 100 each 3    Multiple Vitamins-Minerals (THERAPEUTIC MULTIVITAMIN-MINERALS) tablet Take 1 tablet by mouth daily.  carvedilol (COREG) 12.5 MG tablet Take 12.5 mg by mouth 2 times daily (with meals).  Cholecalciferol (VITAMIN D-3) 1000 UNITS CAPS Take 1 capsule by mouth daily.  acetaminophen (TYLENOL) 500 MG tablet Take 500 mg by mouth every 6 hours as needed for Pain.  loperamide (IMODIUM) 2 MG capsule Take 2 mg by mouth as needed for Diarrhea.  Blood Glucose Calibration (ONETOUCH ULTRA CONTROL VI) by In Vitro route.  spironolactone (ALDACTONE) 25 MG tablet Take 25 mg by mouth daily.  tamsulosin (FLOMAX) 0.4 MG capsule Take 0.4 mg by mouth daily.         aspirin 81 MG tablet Take 81 mg by mouth daily.  Ascorbic Acid (VITAMIN C) 250 MG tablet Take 500 mg by mouth daily. No current facility-administered medications for this visit. Review of Systems     Review of Systems   Constitutional: Negative for activity change, appetite change, chills, diaphoresis, fatigue, fever and unexpected weight change. HENT: Positive for ear discharge, ear pain and hearing loss. Negative for congestion, dental problem, drooling, facial swelling, mouth sores, nosebleeds, postnasal drip, rhinorrhea, sinus pressure, sinus pain, sneezing, sore throat, tinnitus, trouble swallowing and voice change. Eyes: Negative for photophobia, discharge, itching and visual disturbance. Respiratory: Negative for cough, choking, shortness of breath, wheezing and stridor. Gastrointestinal: Negative for blood in stool, constipation, diarrhea, nausea and vomiting. Endocrine: Negative for cold intolerance, heat intolerance, polyphagia and polyuria. Musculoskeletal: Negative for back pain, gait problem, neck pain and neck stiffness. Skin: Negative for color change, pallor, rash and wound. Neurological: Negative for dizziness, syncope, facial asymmetry, speech difficulty, light-headedness, numbness and headaches. Hematological: Negative for adenopathy. Does not bruise/bleed easily. Psychiatric/Behavioral: Negative for agitation, confusion and sleep disturbance. PhysicalExam     Vitals:    03/24/20 1044   BP: 134/68   Pulse: 63       Physical Exam  Constitutional:       Appearance: He is well-developed. HENT:      Head: Normocephalic and atraumatic. Not macrocephalic and not microcephalic. No raccoon eyes, Arguello's sign, abrasion, contusion, right periorbital erythema, left periorbital erythema or laceration. Hair is normal.      Jaw: No trismus. Right Ear: Hearing, tympanic membrane and external ear normal. No decreased hearing noted. Drainage, swelling and tenderness present. No middle ear effusion. No mastoid tenderness. Tympanic membrane is not perforated, retracted or bulging. Tympanic membrane has normal mobility. Left Ear: Hearing, tympanic membrane and external ear normal. No decreased hearing noted. No drainage, swelling or tenderness. No middle ear effusion. No mastoid tenderness. Tympanic membrane is not perforated, retracted or bulging. Tympanic membrane has normal mobility. Nose: No nasal deformity, septal deviation, laceration, mucosal edema or rhinorrhea. Right Sinus: No maxillary sinus tenderness or frontal sinus tenderness. Left Sinus: No maxillary sinus tenderness or frontal sinus tenderness. Mouth/Throat:      Mouth: Mucous membranes are not pale, not dry and not cyanotic. No lacerations or oral lesions. Dentition: Normal dentition. No dental caries or dental abscesses. Pharynx: Uvula midline. No oropharyngeal exudate, posterior oropharyngeal erythema or uvula swelling. Tonsils: No tonsillar abscesses. Eyes:      General: Lids are normal.         Right eye: No discharge. Left eye: No discharge. Extraocular Movements:      Right eye: Normal extraocular motion and no nystagmus. Left eye: Normal extraocular motion and no nystagmus. Conjunctiva/sclera:      Right eye: No chemosis or exudate. Left eye: No chemosis or exudate. Neck:      Musculoskeletal: Neck supple. Thyroid: No thyroid mass or thyromegaly. Vascular: Normal carotid pulses. Trachea: No tracheal tenderness or tracheal deviation. Cardiovascular:      Rate and Rhythm: Normal rate and regular rhythm. Pulmonary:      Effort: No tachypnea, bradypnea or respiratory distress. Breath sounds: No stridor. Musculoskeletal:      Right shoulder: He exhibits normal range of motion.    Lymphadenopathy:      Head:      Right side of head: No submental, submandibular, tonsillar, preauricular, posterior auricular or occipital 1    2. Polyp of right ear canal        Return in about 2 weeks (around 4/7/2020). Portions of this note were dictated using Dragon.  There may be linguistic errors secondary to the use of this program.

## 2020-03-31 ENCOUNTER — TELEPHONE (OUTPATIENT)
Dept: ENT CLINIC | Age: 80
End: 2020-03-31

## 2020-06-01 ENCOUNTER — TELEPHONE (OUTPATIENT)
Dept: DERMATOLOGY | Age: 80
End: 2020-06-01

## 2020-06-02 ENCOUNTER — TELEPHONE (OUTPATIENT)
Dept: DERMATOLOGY | Age: 80
End: 2020-06-02

## 2020-06-23 ENCOUNTER — OFFICE VISIT (OUTPATIENT)
Dept: DERMATOLOGY | Age: 80
End: 2020-06-23
Payer: MEDICARE

## 2020-06-23 VITALS — TEMPERATURE: 97.6 F

## 2020-06-23 PROCEDURE — 1123F ACP DISCUSS/DSCN MKR DOCD: CPT | Performed by: DERMATOLOGY

## 2020-06-23 PROCEDURE — 1036F TOBACCO NON-USER: CPT | Performed by: DERMATOLOGY

## 2020-06-23 PROCEDURE — 17003 DESTRUCT PREMALG LES 2-14: CPT | Performed by: DERMATOLOGY

## 2020-06-23 PROCEDURE — 4040F PNEUMOC VAC/ADMIN/RCVD: CPT | Performed by: DERMATOLOGY

## 2020-06-23 PROCEDURE — 17000 DESTRUCT PREMALG LESION: CPT | Performed by: DERMATOLOGY

## 2020-06-23 PROCEDURE — G8427 DOCREV CUR MEDS BY ELIG CLIN: HCPCS | Performed by: DERMATOLOGY

## 2020-06-23 PROCEDURE — G8417 CALC BMI ABV UP PARAM F/U: HCPCS | Performed by: DERMATOLOGY

## 2020-06-23 PROCEDURE — 99213 OFFICE O/P EST LOW 20 MIN: CPT | Performed by: DERMATOLOGY

## 2020-06-23 RX ORDER — FLUOROURACIL 50 MG/G
CREAM TOPICAL
Qty: 40 G | Refills: 0 | Status: SHIPPED | OUTPATIENT
Start: 2020-06-23 | End: 2020-07-14 | Stop reason: SDUPTHER

## 2020-06-23 NOTE — PROGRESS NOTES
[address] OTHER SURGICAL HISTORY  2013    removal of portacath    TOTAL HIP ARTHROPLASTY  6/1/2012       Allergies   Allergen Reactions    Cardizem [Diltiazem Hcl]      bradycardia    Celebrex [Celecoxib] Other (See Comments)     Ineffective.  Darvon [Propoxyphene Hcl]     Percocet [Oxycodone-Acetaminophen]     Procardia [Nifedipine]      edema     Outpatient Medications Marked as Taking for the 6/23/20 encounter (Office Visit) with Yina Baum MD   Medication Sig Dispense Refill    fluorouracil (EFUDEX) 5 % cream Apply to the top of the scalp, temples and cheeks twice daily for 14 days. 40 g 0    ONE TOUCH ULTRA TEST strip TEST BLOOD SUGAR ONCE A DAY AS NEEDED 257 strip 1    folic acid (FOLVITE) 1 MG tablet TAKE 1 TABLET EVERY DAY (SUBSTITUTED FOR FOLVITE) 90 tablet 0    clopidogrel (PLAVIX) 75 MG tablet TAKE 1 TABLET EVERY DAY 90 tablet 3    glimepiride (AMARYL) 4 MG tablet Take 0.5 tablets by mouth 2 times daily 90 tablet 1    metFORMIN (GLUCOPHAGE) 1000 MG tablet TAKE 1/2 TABLET EVERY MORNING  AND TAKE 1 TABLET EVERY EVENING 135 tablet 0    nitroGLYCERIN (NITROSTAT) 0.4 MG SL tablet Place 1 tablet under the tongue every 5 minutes as needed for Chest pain up to max of 3 total doses. If no relief after 1 dose, call 911. 25 tablet 3    fluorouracil (EFUDEX) 5 % cream Apply twice daily to the top of the scalp twice per day for 14 days. 40 g 0    Cyanocobalamin (VITAMIN B12 PO) Take by mouth      Iron Combinations (IRON COMPLEX PO) Take by mouth      pravastatin (PRAVACHOL) 40 MG tablet Take 40 mg by mouth daily.  ONE TOUCH LANCETS MISC 1 each by Does not apply route daily as needed. 100 each 3    Multiple Vitamins-Minerals (THERAPEUTIC MULTIVITAMIN-MINERALS) tablet Take 1 tablet by mouth daily.  carvedilol (COREG) 12.5 MG tablet Take 12.5 mg by mouth 2 times daily (with meals).  Cholecalciferol (VITAMIN D-3) 1000 UNITS CAPS Take 1 capsule by mouth daily.       acetaminophen (TYLENOL) 500 MG tablet Take 500 mg by mouth every 6 hours as needed for Pain.  loperamide (IMODIUM) 2 MG capsule Take 2 mg by mouth as needed for Diarrhea.  Blood Glucose Calibration (ONETOUCH ULTRA CONTROL VI) by In Vitro route.  spironolactone (ALDACTONE) 25 MG tablet Take 25 mg by mouth daily.  tamsulosin (FLOMAX) 0.4 MG capsule Take 0.4 mg by mouth daily.  aspirin 81 MG tablet Take 81 mg by mouth daily.  Ascorbic Acid (VITAMIN C) 250 MG tablet Take 500 mg by mouth daily. Physical Examination       The following were examined and determined to be normal: Psych/Neuro, Conjunctivae/eyelids, Gums/teeth/lips, Neck, Breast/axilla/chest, Abdomen, Back and LUE. The following were examined and determined to be abnormal: Scalp/hair, Head/face and RUE. Well appearing. 1.  Vertex scalp, temples, lateral cheeks and lateral neck with multiple keratotic erythematous macules, papules and patches. Right lower lateral arm with a scaly erythematous patch. Right nasal tip with a scaly skin colored to pink macule. 2.  Central back with a round skin colored nodule with overlying punctum. 3.  Lower back with few stuck on appearing verrucous brown papules. Assessment and Plan     1. Actinic keratosis -multiple hyperkeratotic lesions on the scalp, face and neck today    Efudex cream twice daily to the affected areas on the vertex scalp, lateral portions of the face and neck for 14 days (gave instructions to use up to 28 days if needed to achieve crusting of the lesions). We discussed the likely side effects of treatment including redness, crusting, itching and burning. 2 cycles of liquid nitrogen applied to 5 AKs on the vertex scalp and 1 on the right arm. Patient was educated regarding the potential risks of blister formation, discomfort, hypopigmentation, and scar. Wound care was discussed.       2. Epidermoid cyst - not inflamed    Reassurance. 3. SK (seborrheic keratosis)     Reassurance. Return in about 2 months (around 8/23/2020).

## 2020-06-23 NOTE — PATIENT INSTRUCTIONS
Julianne Roth,    Let's treat Kevin's to of the scalp, temples and sides of the cheeks near/under the ears with fluorouracil 5% cream twice per day for the next 14 days. I want the areas to scab/crust over, so it may take longer than 14 days to achieve that - if need be use up to 28 days.       Thanks,    Brijesh Liu

## 2020-07-14 ENCOUNTER — TELEPHONE (OUTPATIENT)
Dept: DERMATOLOGY | Age: 80
End: 2020-07-14

## 2020-07-14 RX ORDER — FLUOROURACIL 50 MG/G
CREAM TOPICAL
Qty: 40 G | Refills: 0 | Status: SHIPPED | OUTPATIENT
Start: 2020-07-14 | End: 2021-03-01 | Stop reason: SDUPTHER

## 2020-07-14 NOTE — TELEPHONE ENCOUNTER
Dr. Delfino Ureña patient     Patient called and has sent in pictures of his head. It is not any better. It may be worse. Please give him a call to see what he needs to do now.     Call back # [phone]

## 2020-07-14 NOTE — TELEPHONE ENCOUNTER
Called and spoke to patient and he used Efudex for 2 weeks twice daily. He would like a refill of it if you think it would be appropriate. He needs it sent to [facility].

## 2020-08-31 ENCOUNTER — OFFICE VISIT (OUTPATIENT)
Dept: DERMATOLOGY | Age: 80
End: 2020-08-31
Payer: MEDICARE

## 2020-08-31 VITALS — TEMPERATURE: 98.4 F

## 2020-08-31 PROCEDURE — 1123F ACP DISCUSS/DSCN MKR DOCD: CPT | Performed by: DERMATOLOGY

## 2020-08-31 PROCEDURE — 1036F TOBACCO NON-USER: CPT | Performed by: DERMATOLOGY

## 2020-08-31 PROCEDURE — G8417 CALC BMI ABV UP PARAM F/U: HCPCS | Performed by: DERMATOLOGY

## 2020-08-31 PROCEDURE — 4040F PNEUMOC VAC/ADMIN/RCVD: CPT | Performed by: DERMATOLOGY

## 2020-08-31 PROCEDURE — 99213 OFFICE O/P EST LOW 20 MIN: CPT | Performed by: DERMATOLOGY

## 2020-08-31 PROCEDURE — G8427 DOCREV CUR MEDS BY ELIG CLIN: HCPCS | Performed by: DERMATOLOGY

## 2020-08-31 NOTE — PROGRESS NOTES
[facility]  Devon Montana MD  [address] [employer]  1940    78 y.o. male     Date of Visit: 8/31/2020    Chief Complaint: AKs    History of Present Illness:    1. He returns today to follow-up for multiple hyperkeratotic actinic keratoses of the vertex scalp. He had marked improvement with use of Efudex cream twice daily for 4 weeks used about 2 months ago. 2.  He also complains of a persistent lesion on the right temple. 3.  He also complains of easy bruising on the forearms. 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 Nodular BCC of the right upper forehead-treated with Mohs by Dr. Sumit Macario on 8/3/2016. Superficial BCC of the right superior lateral leg-treated with curettage on 4/14/2016     Has stage 3 CKD, type 2 DM. Review of Systems:  Skin: No new or changing moles. Past Medical History, Family History, Surgical History, Medications and Allergies reviewed.     Past Medical History:   Diagnosis Date    Abnormalities of the hair     Allergic rhinitis, cause unspecified     Arthritis     CAD (coronary artery disease)     Cancer ([address] 3/2009    Colon cancer and resection    Degeneration of cervical intervertebral disc     Diabetes mellitus ([address]     Heart attack ([address] 4/2010    Hyperlipidemia     Hypertension     Hypertrophy of prostate without urinary obstruction and other lower urinary tract symptoms (LUTS)     Nephrolithiasis     Osteoarthrosis, unspecified whether generalized or localized, unspecified site     Personal history of malignant neoplasm of large intestine     Unspecified hereditary and idiopathic peripheral neuropathy     Unspecified sleep apnea      Past Surgical History:   Procedure Laterality Date    CARDIAC SURGERY  4/9/2010    CABG x 2    CHOLECYSTECTOMY  7/31/2009    COLON SURGERY      MOHS SURGERY      OTHER SURGICAL HISTORY 2013    removal of portacath    TOTAL HIP ARTHROPLASTY  6/1/2012       Allergies   Allergen Reactions    Cardizem [Diltiazem Hcl]      bradycardia    Celebrex [Celecoxib] Other (See Comments)     Ineffective.  Darvon [Propoxyphene Hcl]     Percocet [Oxycodone-Acetaminophen]     Procardia [Nifedipine]      edema     Outpatient Medications Marked as Taking for the 8/31/20 encounter (Office Visit) with Julianne José MD   Medication Sig Dispense Refill    ONE TOUCH ULTRA TEST strip TEST BLOOD SUGAR ONCE A DAY AS NEEDED 672 strip 1    folic acid (FOLVITE) 1 MG tablet TAKE 1 TABLET EVERY DAY (SUBSTITUTED FOR FOLVITE) 90 tablet 0    clopidogrel (PLAVIX) 75 MG tablet TAKE 1 TABLET EVERY DAY 90 tablet 3    glimepiride (AMARYL) 4 MG tablet Take 0.5 tablets by mouth 2 times daily 90 tablet 1    metFORMIN (GLUCOPHAGE) 1000 MG tablet TAKE 1/2 TABLET EVERY MORNING  AND TAKE 1 TABLET EVERY EVENING 135 tablet 0    nitroGLYCERIN (NITROSTAT) 0.4 MG SL tablet Place 1 tablet under the tongue every 5 minutes as needed for Chest pain up to max of 3 total doses. If no relief after 1 dose, call 911. 25 tablet 3    Cyanocobalamin (VITAMIN B12 PO) Take by mouth      Iron Combinations (IRON COMPLEX PO) Take by mouth      pravastatin (PRAVACHOL) 40 MG tablet Take 40 mg by mouth daily.  ONE TOUCH LANCETS MISC 1 each by Does not apply route daily as needed. 100 each 3    Multiple Vitamins-Minerals (THERAPEUTIC MULTIVITAMIN-MINERALS) tablet Take 1 tablet by mouth daily.  carvedilol (COREG) 12.5 MG tablet Take 12.5 mg by mouth 2 times daily (with meals).  Cholecalciferol (VITAMIN D-3) 1000 UNITS CAPS Take 1 capsule by mouth daily.  acetaminophen (TYLENOL) 500 MG tablet Take 500 mg by mouth every 6 hours as needed for Pain.  loperamide (IMODIUM) 2 MG capsule Take 2 mg by mouth as needed for Diarrhea.  Blood Glucose Calibration (ONETOUCH ULTRA CONTROL VI) by In Vitro route.       spironolactone (ALDACTONE) 25 MG tablet Take 25 mg by mouth daily.  tamsulosin (FLOMAX) 0.4 MG capsule Take 0.4 mg by mouth daily.  aspirin 81 MG tablet Take 81 mg by mouth daily.  Ascorbic Acid (VITAMIN C) 250 MG tablet Take 500 mg by mouth daily. Physical Examination       The following were examined and determined to be normal: Psych/Neuro, Conjunctivae/eyelids, Gums/teeth/lips and Neck. The following were examined and determined to be abnormal: Scalp/hair, Head/face, RUE and LUE. Well-appearing. 1.  Vertex scalp with very few slightly scaly pink macules. Otherwise clear. Lateral portions of the face with a rare skin colored scaly macule. 2.  Right temple with a stuck on appearing verrucous brown plaque. 3.  Forearms and dorsum of the hands with few purpuric macules and patches. Assessment and Plan     1. Actinic keratoses -previously multiple hyperkeratotic lesions on the vertex scalp, nearly clear following use of Efudex cream.    Observe for now. Continue sun protective behaviors. Return in 6 months. 2. SK (seborrheic keratosis)     Reassurance. 3. Solar purpura     Reassurance. Return in about 6 months (around 2/28/2021).

## 2021-03-01 ENCOUNTER — OFFICE VISIT (OUTPATIENT)
Dept: DERMATOLOGY | Age: 81
End: 2021-03-01
Payer: MEDICARE

## 2021-03-01 VITALS — TEMPERATURE: 97.3 F

## 2021-03-01 DIAGNOSIS — L57.0 ACTINIC KERATOSIS: Primary | ICD-10-CM

## 2021-03-01 PROCEDURE — 17000 DESTRUCT PREMALG LESION: CPT | Performed by: DERMATOLOGY

## 2021-03-01 PROCEDURE — 99213 OFFICE O/P EST LOW 20 MIN: CPT | Performed by: DERMATOLOGY

## 2021-03-01 PROCEDURE — 1036F TOBACCO NON-USER: CPT | Performed by: DERMATOLOGY

## 2021-03-01 PROCEDURE — G8427 DOCREV CUR MEDS BY ELIG CLIN: HCPCS | Performed by: DERMATOLOGY

## 2021-03-01 PROCEDURE — 17003 DESTRUCT PREMALG LES 2-14: CPT | Performed by: DERMATOLOGY

## 2021-03-01 PROCEDURE — 1123F ACP DISCUSS/DSCN MKR DOCD: CPT | Performed by: DERMATOLOGY

## 2021-03-01 PROCEDURE — G8417 CALC BMI ABV UP PARAM F/U: HCPCS | Performed by: DERMATOLOGY

## 2021-03-01 PROCEDURE — G8484 FLU IMMUNIZE NO ADMIN: HCPCS | Performed by: DERMATOLOGY

## 2021-03-01 PROCEDURE — 4040F PNEUMOC VAC/ADMIN/RCVD: CPT | Performed by: DERMATOLOGY

## 2021-03-01 RX ORDER — AMLODIPINE BESYLATE 2.5 MG/1
2.5 TABLET ORAL 2 TIMES DAILY
COMMUNITY

## 2021-03-01 RX ORDER — FAMOTIDINE 10 MG
10 TABLET ORAL 2 TIMES DAILY
COMMUNITY

## 2021-03-01 RX ORDER — CLOPIDOGREL BISULFATE 75 MG/1
75 TABLET ORAL DAILY
COMMUNITY
End: 2022-11-04

## 2021-03-01 RX ORDER — FLUOROURACIL 50 MG/G
CREAM TOPICAL
Qty: 40 G | Refills: 0 | Status: SHIPPED | OUTPATIENT
Start: 2021-03-01

## 2021-03-01 RX ORDER — LORAZEPAM 0.5 MG/1
0.5 TABLET ORAL EVERY 6 HOURS PRN
COMMUNITY
End: 2022-11-04

## 2021-03-01 NOTE — PATIENT INSTRUCTIONS
Fluorouracil (Efudex / Carac)     Your physician has prescribed a topical medication that is used to treat pre-cancerous skin lesions and certain types of skin cancers. We are providing you with the following information so that you understand how the medication should be used and potential side effects you may experience.  Clean and dry the areas of the skin that will be treated.  Apply a small amount of the medication to your fingertip. Dab the medication onto the designated areas and rub it in.  Discontinue the medication once the skin starts to scab. Typically this takes between 2 and 4 weeks after starting the medication.  Avoid applying the medication in or around the eyes or eyelids. If the medication gets into your eyes, nose or mouth, rinse immediately.  Wash your hands immediately after application.  Side effects include: burning, redness, irritation, dryness, pain, swelling and changes in skin color. Vaseline and/or cool compresses may be applied to affected areas for comfort.  Please note that you may be more sensitive to the sun. Use sunscreen and wear protective clothing when outdoors. Avoid prolonged sun exposure. · Once you have discontinued the medication, apply vaseline several times daily until the skin has healed.    ·

## 2021-03-01 NOTE — PROGRESS NOTES
[facility]  Gregorio Millard MD  [address]  1940    78 y.o. male     Date of Visit: 3/1/2021    Chief Complaint: skin lesions    History of Present Illness:    He presents today to follow-up for history of actinic keratoses-complains of several lesions on the scalp and face today. Previously did well with Efudex therapy. 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 Nodular BCC of the right upper forehead-treated with Mohs by Dr. Mac Sharma on 8/3/2016. Superficial BCC of the right superior lateral leg-treated with curettage on 4/14/2016     Has stage 3 CKD, type 2 DM. Review of Systems:  Gen: Feels well, good sense of health. Past Medical History, Family History, Surgical History, Medications and Allergies reviewed.     Past Medical History:   Diagnosis Date    Abnormalities of the hair     Allergic rhinitis, cause unspecified     Arthritis     CAD (coronary artery disease)     Cancer ([address] 3/2009    Colon cancer and resection    Degeneration of cervical intervertebral disc     Diabetes mellitus ([address]     Heart attack ([address] 4/2010    Hyperlipidemia     Hypertension     Hypertrophy of prostate without urinary obstruction and other lower urinary tract symptoms (LUTS)     Nephrolithiasis     Osteoarthrosis, unspecified whether generalized or localized, unspecified site     Personal history of malignant neoplasm of large intestine     Unspecified hereditary and idiopathic peripheral neuropathy     Unspecified sleep apnea      Past Surgical History:   Procedure Laterality Date    CARDIAC SURGERY  4/9/2010    CABG x 2    CHOLECYSTECTOMY  7/31/2009    COLON SURGERY      MOHS SURGERY      OTHER SURGICAL HISTORY  2013    removal of portacath    TOTAL HIP ARTHROPLASTY  6/1/2012       Allergies   Allergen Reactions    Cardizem [Diltiazem Hcl]      bradycardia    Celebrex [Celecoxib] Other (See Comments)     Ineffective.  Darvon [Propoxyphene Hcl]     Percocet [Oxycodone-Acetaminophen]     Procardia [Nifedipine]      edema     Outpatient Medications Marked as Taking for the 3/1/21 encounter (Office Visit) with Caitlin Mcrae MD   Medication Sig Dispense Refill    LORazepam (ATIVAN) 0.5 MG tablet Take 0.5 mg by mouth every 6 hours as needed for Anxiety.  amLODIPine (NORVASC) 2.5 MG tablet Take 2.5 mg by mouth 2 times daily      SITagliptin (JANUVIA) 100 MG tablet Take 100 mg by mouth daily      famotidine (PEPCID) 10 MG tablet Take 10 mg by mouth 2 times daily      fluorouracil (EFUDEX) 5 % cream Apply to the top of the scalp twice daily for 28 days. 40 g 0    ONE TOUCH ULTRA TEST strip TEST BLOOD SUGAR ONCE A DAY AS NEEDED 436 strip 1    folic acid (FOLVITE) 1 MG tablet TAKE 1 TABLET EVERY DAY (SUBSTITUTED FOR FOLVITE) 90 tablet 0    metFORMIN (GLUCOPHAGE) 1000 MG tablet TAKE 1/2 TABLET EVERY MORNING  AND TAKE 1 TABLET EVERY EVENING 135 tablet 0    nitroGLYCERIN (NITROSTAT) 0.4 MG SL tablet Place 1 tablet under the tongue every 5 minutes as needed for Chest pain up to max of 3 total doses. If no relief after 1 dose, call 911. 25 tablet 3    Cyanocobalamin (VITAMIN B12 PO) Take by mouth      Iron Combinations (IRON COMPLEX PO) Take by mouth      pravastatin (PRAVACHOL) 40 MG tablet Take 40 mg by mouth daily.  Multiple Vitamins-Minerals (THERAPEUTIC MULTIVITAMIN-MINERALS) tablet Take 1 tablet by mouth daily.  Cholecalciferol (VITAMIN D-3) 1000 UNITS CAPS Take 1 capsule by mouth daily.  acetaminophen (TYLENOL) 500 MG tablet Take 500 mg by mouth every 6 hours as needed for Pain.  loperamide (IMODIUM) 2 MG capsule Take 2 mg by mouth as needed for Diarrhea.  spironolactone (ALDACTONE) 25 MG tablet Take 25 mg by mouth daily.  tamsulosin (FLOMAX) 0.4 MG capsule Take 0.4 mg by mouth daily.         aspirin 81 MG tablet Take 81 mg

## 2021-03-30 NOTE — TELEPHONE ENCOUNTER
Please see if there is message on scanned paperwork. He dropped off blood sugars back in July and I annotated on them, but I don't see them scanned. I had wanted him to get off the glimepiride, but I recall (I think) letting him continue with 1/2 tab BID with this medication (so 2mg BID) because he was worried about elevated sugars. I just want to make sure I'm telling him what I wrote.  I don't think it was changed in his med list. Methotrexate Pregnancy And Lactation Text: This medication is Pregnancy Category X and is known to cause fetal harm. This medication is excreted in breast milk.

## 2021-08-27 ENCOUNTER — TELEPHONE (OUTPATIENT)
Dept: DERMATOLOGY | Age: 81
End: 2021-08-27

## 2021-10-12 ENCOUNTER — OFFICE VISIT (OUTPATIENT)
Dept: DERMATOLOGY | Age: 81
End: 2021-10-12
Payer: MEDICARE

## 2021-10-12 VITALS — TEMPERATURE: 96.8 F

## 2021-10-12 DIAGNOSIS — D04.61 BOWEN'S DISEASE OF RIGHT UPPER ARM: ICD-10-CM

## 2021-10-12 DIAGNOSIS — L57.0 ACTINIC KERATOSIS: Primary | ICD-10-CM

## 2021-10-12 PROCEDURE — 99213 OFFICE O/P EST LOW 20 MIN: CPT | Performed by: DERMATOLOGY

## 2021-10-12 PROCEDURE — G8427 DOCREV CUR MEDS BY ELIG CLIN: HCPCS | Performed by: DERMATOLOGY

## 2021-10-12 PROCEDURE — 1036F TOBACCO NON-USER: CPT | Performed by: DERMATOLOGY

## 2021-10-12 PROCEDURE — G8421 BMI NOT CALCULATED: HCPCS | Performed by: DERMATOLOGY

## 2021-10-12 PROCEDURE — 17262 DSTRJ MAL LES T/A/L 1.1-2.0: CPT | Performed by: DERMATOLOGY

## 2021-10-12 PROCEDURE — 1123F ACP DISCUSS/DSCN MKR DOCD: CPT | Performed by: DERMATOLOGY

## 2021-10-12 PROCEDURE — G8484 FLU IMMUNIZE NO ADMIN: HCPCS | Performed by: DERMATOLOGY

## 2021-10-12 PROCEDURE — 4040F PNEUMOC VAC/ADMIN/RCVD: CPT | Performed by: DERMATOLOGY

## 2021-10-12 RX ORDER — FLUOROURACIL 50 MG/G
CREAM TOPICAL
Qty: 40 G | Refills: 0 | Status: SHIPPED | OUTPATIENT
Start: 2021-10-12 | End: 2021-11-03 | Stop reason: SDUPTHER

## 2021-10-12 NOTE — PATIENT INSTRUCTIONS
Plan:    Treat the top of the scalp with 5-fluorouracil cream twice daily for the next 28 days. Note, it will cause redness, irritation and crusting. For the thicker scaly pink lesions on the shins: Apply 5-fluorouracil cream twice daily for 28 days. Biopsy Wound Care Instructions    · Keep the bandage in place for 24 hours. · Cleanse the wound with mild soapy water daily   Gently dry the area.  Apply Vaseline or petroleum jelly to the wound using a cotton tipped applicator.  Cover with a clean bandage.  Repeat this process until the biopsy site is healed.  If you had stitches placed, continue treating the site until the stitches are removed. Remember to make an appointment to return to have your stitches removed by our staff.  You may shower and bathe as usual.       ** Biopsy results generally take around 7 business days to come back. If you have not heard from us by then, please call the office at [phone]. *Please note that biopsy results are released to both the patient and physician at the same time in [address] Please allow time for your physician to review the results. One of our staff members will reach out to you with the results and plan.
No

## 2021-10-12 NOTE — PROGRESS NOTES
[facility]  Taz Sesay MD  [address]  1940    78 y.o. male     Date of Visit: 10/12/2021    Chief Complaint: skin lesions    History of Present Illness:    1. Follow-up for history of actinic keratosescomplains of persistent scaly lesions on the scalp and shins. 2.  He also complains of a painful lesion on the right upper arm. 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 Nodular BCC of the right upper foreheadtreated with Mohs by Dr. Kindra Iglesias on 8/3/2016. Superficial BCC of the right superior lateral legtreated with curettage on 4/14/2016     Has stage 3 CKD, type 2 DM. Review of Systems:  Gen: Feels well, good sense of health. Skin: No new or changing moles. Past Medical History, Family History, Surgical History, Medications and Allergies reviewed.     Past Medical History:   Diagnosis Date    Abnormalities of the hair     Allergic rhinitis, cause unspecified     Arthritis     CAD (coronary artery disease)     Cancer ([address] 3/2009    Colon cancer and resection    Degeneration of cervical intervertebral disc     Diabetes mellitus ([address]     Heart attack ([address] 4/2010    Hyperlipidemia     Hypertension     Hypertrophy of prostate without urinary obstruction and other lower urinary tract symptoms (LUTS)     Nephrolithiasis     Osteoarthrosis, unspecified whether generalized or localized, unspecified site     Personal history of malignant neoplasm of large intestine     Unspecified hereditary and idiopathic peripheral neuropathy     Unspecified sleep apnea      Past Surgical History:   Procedure Laterality Date    CARDIAC SURGERY  4/9/2010    CABG x 2    CHOLECYSTECTOMY  7/31/2009    COLON SURGERY      MOHS SURGERY      OTHER SURGICAL HISTORY  2013    removal of portacath    TOTAL HIP ARTHROPLASTY  6/1/2012       Allergies   Allergen Reactions  Cardizem [Diltiazem Hcl]      bradycardia    Celebrex [Celecoxib] Other (See Comments)     Ineffective.  Darvon [Propoxyphene Hcl]     Percocet [Oxycodone-Acetaminophen]     Procardia [Nifedipine]      edema     Outpatient Medications Marked as Taking for the 10/12/21 encounter (Office Visit) with Radha Funez MD   Medication Sig Dispense Refill    fluorouracil (EFUDEX) 5 % cream Apply to the top of the scalp and lesions on the shins twice daily for 28 days. 40 g 0    LORazepam (ATIVAN) 0.5 MG tablet Take 0.5 mg by mouth every 6 hours as needed for Anxiety.  amLODIPine (NORVASC) 2.5 MG tablet Take 2.5 mg by mouth 2 times daily      SITagliptin (JANUVIA) 100 MG tablet Take 100 mg by mouth daily      famotidine (PEPCID) 10 MG tablet Take 10 mg by mouth 2 times daily      fluorouracil (EFUDEX) 5 % cream Apply to the top of the scalp twice daily for 28 days. 40 g 0    clopidogrel (PLAVIX) 75 MG tablet Take 75 mg by mouth daily      ONE TOUCH ULTRA TEST strip TEST BLOOD SUGAR ONCE A DAY AS NEEDED 930 strip 1    folic acid (FOLVITE) 1 MG tablet TAKE 1 TABLET EVERY DAY (SUBSTITUTED FOR FOLVITE) 90 tablet 0    metFORMIN (GLUCOPHAGE) 1000 MG tablet TAKE 1/2 TABLET EVERY MORNING  AND TAKE 1 TABLET EVERY EVENING 135 tablet 0    nitroGLYCERIN (NITROSTAT) 0.4 MG SL tablet Place 1 tablet under the tongue every 5 minutes as needed for Chest pain up to max of 3 total doses. If no relief after 1 dose, call 911. 25 tablet 3    Cyanocobalamin (VITAMIN B12 PO) Take by mouth      Iron Combinations (IRON COMPLEX PO) Take by mouth      pravastatin (PRAVACHOL) 40 MG tablet Take 40 mg by mouth daily.  ONE TOUCH LANCETS MISC 1 each by Does not apply route daily as needed. 100 each 3    Multiple Vitamins-Minerals (THERAPEUTIC MULTIVITAMIN-MINERALS) tablet Take 1 tablet by mouth daily.  acetaminophen (TYLENOL) 500 MG tablet Take 500 mg by mouth every 6 hours as needed for Pain.       loperamide (IMODIUM) 2 MG capsule Take 2 mg by mouth as needed for Diarrhea.  Blood Glucose Calibration (ONETOUCH ULTRA CONTROL VI) by In Vitro route.  spironolactone (ALDACTONE) 25 MG tablet Take 25 mg by mouth daily.  tamsulosin (FLOMAX) 0.4 MG capsule Take 0.4 mg by mouth daily.  aspirin 81 MG tablet Take 81 mg by mouth daily.  Ascorbic Acid (VITAMIN C) 250 MG tablet Take 500 mg by mouth daily. Physical Examination       The following were examined and determined to be normal: Psych/Neuro, Conjunctivae/eyelids, Gums/teeth/lips, Neck, Breast/axilla/chest, Abdomen, Back, LUE and Nails/digits. The following were examined and determined to be abnormal: Scalp/hair, Head/face, RUE, RLE and LLE. Well-appearing. 1.  Vertex scalp with multiple scaly pink macules and patches. Left lower forehead lateral brow3, left antitragus1: Few keratotic erythematous macules. Shins with several larger hyperkeratotic pink patches. 2.  Lateral lower aspect of the right upper arm with a 1.5 cm oval-shaped hyperkeratotic pink patch. Assessment and Plan     1. Actinic keratosis - multiple, chronic lesions    Efudex cream twice daily to the top of the scalp and lesions on the shins for 28 days. We discussed the likely side effects of treatment including redness, crusting, itching and burning. 2. 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 The area(s) to be biopsied were marked with a surgical pen. Alcohol was used to cleanse the site. Local anesthesia was acheived with 1% lidocaine with epinephrine. Shave biopsy was performed using a razor blade followed by sharp curettage in multiple directions. Hemostasis was achieved with aluminum chloride. The wound(s) were dressed with petrolatum and covered with a bandage. Wound care instructions were reviewed.   1 Specimen (s) sent to pathology. The specimen bottles were appropriately labeled. We also reviewed the risks of bleeding, scar, and infection. Return in about 3 months (around 1/12/2022).     --Hood Lozano MD

## 2021-10-14 LAB — DERMATOLOGY PATHOLOGY REPORT: ABNORMAL

## 2021-11-03 RX ORDER — FLUOROURACIL 50 MG/G
CREAM TOPICAL
Qty: 40 G | Refills: 0 | Status: SHIPPED | OUTPATIENT
Start: 2021-11-03

## 2021-11-03 NOTE — TELEPHONE ENCOUNTER
Pt calling need a refill on medication fluorouracil efudex 5% cream [facility] any further questions pls call patient back @ [phone] to discuss

## 2022-01-11 ENCOUNTER — OFFICE VISIT (OUTPATIENT)
Dept: DERMATOLOGY | Age: 82
End: 2022-01-11
Payer: MEDICARE

## 2022-01-11 VITALS — TEMPERATURE: 97.1 F

## 2022-01-11 DIAGNOSIS — L57.0 ACTINIC KERATOSIS: Primary | ICD-10-CM

## 2022-01-11 PROCEDURE — 17000 DESTRUCT PREMALG LESION: CPT | Performed by: DERMATOLOGY

## 2022-01-11 PROCEDURE — 17003 DESTRUCT PREMALG LES 2-14: CPT | Performed by: DERMATOLOGY

## 2022-01-11 RX ORDER — FLUOROURACIL 50 MG/G
CREAM TOPICAL
Status: CANCELLED | OUTPATIENT
Start: 2022-01-11

## 2022-01-11 NOTE — PROGRESS NOTES
[facility] Dermatology  Didi Vaughan MD  [address]  1940    78 y.o. male     Date of Visit: 1/11/2022    Chief Complaint: skin lesions    History of Present Illness:    1. 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 He was treated with Efudex cream BID x 2 weeks with marked improvement. He has several new persistent scaly lesions on the scalp and face. 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 Nodular BCC of the right upper foreheadtreated with Mohs by Dr. En Barreto on 8/3/2016. Superficial BCC of the right superior lateral legtreated with curettage on 4/14/2016     Has stage 3 CKD, type 2 DM. Review of Systems:  Gen: Feels well, good sense of health. Skin: No new or changing moles. Past Medical History, Family History, Surgical History, Medications and Allergies reviewed.     Past Medical History:   Diagnosis Date    Abnormalities of the hair     Allergic rhinitis, cause unspecified     Arthritis     CAD (coronary artery disease)     Cancer ([address] 3/2009    Colon cancer and resection    Degeneration of cervical intervertebral disc     Diabetes mellitus ([address]     Heart attack ([address] 4/2010    Hyperlipidemia     Hypertension     Hypertrophy of prostate without urinary obstruction and other lower urinary tract symptoms (LUTS)     Nephrolithiasis     Osteoarthrosis, unspecified whether generalized or localized, unspecified site     Personal history of malignant neoplasm of large intestine     Unspecified hereditary and idiopathic peripheral neuropathy     Unspecified sleep apnea      Past Surgical History:   Procedure Laterality Date    CARDIAC SURGERY  4/9/2010    CABG x 2    CHOLECYSTECTOMY  7/31/2009    COLON SURGERY      MOHS SURGERY      OTHER SURGICAL HISTORY 2013    removal of portacath    TOTAL HIP ARTHROPLASTY  6/1/2012       Allergies   Allergen Reactions    Cardizem [Diltiazem Hcl]      bradycardia    Celebrex [Celecoxib] Other (See Comments)     Ineffective.  Darvon [Propoxyphene Hcl]     Percocet [Oxycodone-Acetaminophen]     Procardia [Nifedipine]      edema     Outpatient Medications Marked as Taking for the 1/11/22 encounter (Office Visit) with Atilio Frost MD   Medication Sig Dispense Refill    LORazepam (ATIVAN) 0.5 MG tablet Take 0.5 mg by mouth every 6 hours as needed for Anxiety.  amLODIPine (NORVASC) 2.5 MG tablet Take 2.5 mg by mouth 2 times daily      SITagliptin (JANUVIA) 100 MG tablet Take 100 mg by mouth daily      famotidine (PEPCID) 10 MG tablet Take 10 mg by mouth 2 times daily      fluorouracil (EFUDEX) 5 % cream Apply to the top of the scalp twice daily for 28 days. 40 g 0    clopidogrel (PLAVIX) 75 MG tablet Take 75 mg by mouth daily      ONE TOUCH ULTRA TEST strip TEST BLOOD SUGAR ONCE A DAY AS NEEDED 796 strip 1    folic acid (FOLVITE) 1 MG tablet TAKE 1 TABLET EVERY DAY (SUBSTITUTED FOR FOLVITE) 90 tablet 0    metFORMIN (GLUCOPHAGE) 1000 MG tablet TAKE 1/2 TABLET EVERY MORNING  AND TAKE 1 TABLET EVERY EVENING 135 tablet 0    nitroGLYCERIN (NITROSTAT) 0.4 MG SL tablet Place 1 tablet under the tongue every 5 minutes as needed for Chest pain up to max of 3 total doses. If no relief after 1 dose, call 911. 25 tablet 3    Cyanocobalamin (VITAMIN B12 PO) Take by mouth      Iron Combinations (IRON COMPLEX PO) Take by mouth      pravastatin (PRAVACHOL) 40 MG tablet Take 40 mg by mouth daily.  ONE TOUCH LANCETS MISC 1 each by Does not apply route daily as needed. 100 each 3    Multiple Vitamins-Minerals (THERAPEUTIC MULTIVITAMIN-MINERALS) tablet Take 1 tablet by mouth daily.  acetaminophen (TYLENOL) 500 MG tablet Take 500 mg by mouth every 6 hours as needed for Pain.       loperamide (IMODIUM) 2 MG capsule Take 2 mg by mouth as needed for Diarrhea.  Blood Glucose Calibration (ONETOUCH ULTRA CONTROL VI) by In Vitro route.  spironolactone (ALDACTONE) 25 MG tablet Take 25 mg by mouth daily.  tamsulosin (FLOMAX) 0.4 MG capsule Take 0.4 mg by mouth daily.  aspirin 81 MG tablet Take 81 mg by mouth daily.  Ascorbic Acid (VITAMIN C) 250 MG tablet Take 500 mg by mouth daily. Physical Examination       The following were examined and determined to be normal: Psych/Neuro, Conjunctivae/eyelids, Gums/teeth/lips, Neck, Breast/axilla/chest, Abdomen, Back, RUE, LUE, RLE and LLE. The following were examined and determined to be abnormal: Scalp/hair and Head/face. Well appearing. 1.  Right lower cheek - 2, right lateral cheek - 1, right mid vertex scalp - 1, left cheek - 1, left superior helix - 1: ill defined keratotic pink macules. Shins - clear! Assessment and Plan     1. Actinic keratoses - shins clear after use of Efudex, several new lesions on the scalp, face and left ear    2 cycles of liquid nitrogen applied to 6 AKs: Right lower cheek - 2, right lateral cheek - 1, right mid vertex scalp - 1, left cheek - 1, left superior helix - 1. Patient was educated regarding the potential risks of blister formation and discomfort. Wound care was discussed. Return in about 4 months (around 5/11/2022).     --Anita Kaminski MD

## 2022-05-19 ENCOUNTER — OFFICE VISIT (OUTPATIENT)
Dept: DERMATOLOGY | Age: 82
End: 2022-05-19
Payer: MEDICARE

## 2022-05-19 DIAGNOSIS — D48.5 NEOPLASM OF UNCERTAIN BEHAVIOR OF SKIN: Primary | ICD-10-CM

## 2022-05-19 DIAGNOSIS — L57.0 AK (ACTINIC KERATOSIS): ICD-10-CM

## 2022-05-19 PROCEDURE — 17000 DESTRUCT PREMALG LESION: CPT | Performed by: DERMATOLOGY

## 2022-05-19 PROCEDURE — 17260 DSTRJ MAL LES T/A/L 0.5 CM/<: CPT | Performed by: DERMATOLOGY

## 2022-05-19 PROCEDURE — 17003 DESTRUCT PREMALG LES 2-14: CPT | Performed by: DERMATOLOGY

## 2022-05-19 NOTE — PATIENT INSTRUCTIONS
Biopsy Wound Care Instructions    · Keep the bandage in place for 24 hours. · Cleanse the wound with mild soapy water daily   Gently dry the area.  Apply Vaseline or petroleum jelly to the wound using a cotton tipped applicator.  Cover with a clean bandage.  Repeat this process until the biopsy site is healed.  If you had stitches placed, continue treating the site until the stitches are removed. Remember to make an appointment to return to have your stitches removed by our staff.  You may shower and bathe as usual.       ** Biopsy results generally take around 7 business days to come back. If you have not heard from us by then, please call the office at [phone]. *Please note that biopsy results are released to both the patient and physician at the same time in [address] Please allow time for your physician to review the results. One of our staff members will reach out to you with the results and plan.

## 2022-05-19 NOTE — PROGRESS NOTES
[facility] Dermatology  Mattie Hernandez MD  [address]  1940    78 y.o. male     Date of Visit: 5/19/2022    Chief Complaint: skin lesions    History of Present Illness:    1. He complains of a persistent mildly tender lesion on the right forearm. 2.  Follow-up for history of AK'streated with cryotherapy and Efudex in the past.  Has a couple of new lesions on the scalp today. 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 Nodular BCC of the right upper foreheadtreated with Mohs by Dr. Adriane Roberts on 8/3/2016. Superficial BCC of the right superior lateral legtreated with curettage on 4/14/2016     Has stage 3 CKD, type 2 DM. Review of Systems:  Gen: Feels well, good sense of health. Past Medical History, Family History, Surgical History, Medications and Allergies reviewed.     Past Medical History:   Diagnosis Date    Abnormalities of the hair     Allergic rhinitis, cause unspecified     Arthritis     CAD (coronary artery disease)     Cancer ([address] 3/2009    Colon cancer and resection    Degeneration of cervical intervertebral disc     Diabetes mellitus ([address]     Heart attack ([address] 4/2010    Hyperlipidemia     Hypertension     Hypertrophy of prostate without urinary obstruction and other lower urinary tract symptoms (LUTS)     Nephrolithiasis     Osteoarthrosis, unspecified whether generalized or localized, unspecified site     Personal history of malignant neoplasm of large intestine     Unspecified hereditary and idiopathic peripheral neuropathy     Unspecified sleep apnea      Past Surgical History:   Procedure Laterality Date    CARDIAC SURGERY  4/9/2010    CABG x 2    CHOLECYSTECTOMY  7/31/2009    COLON SURGERY      MOHS SURGERY      OTHER SURGICAL HISTORY  2013    removal of portacath  TOTAL HIP ARTHROPLASTY  6/1/2012       Allergies   Allergen Reactions    Cardizem [Diltiazem Hcl]      bradycardia    Celebrex [Celecoxib] Other (See Comments)     Ineffective.  Darvon [Propoxyphene Hcl]     Percocet [Oxycodone-Acetaminophen]     Procardia [Nifedipine]      edema     Outpatient Medications Marked as Taking for the 5/19/22 encounter (Office Visit) with Jackee Gosselin, MD   Medication Sig Dispense Refill    fluorouracil (EFUDEX) 5 % cream Apply to the top of the scalp and lesions on the shins twice daily for 28 days. 40 g 0    LORazepam (ATIVAN) 0.5 MG tablet Take 0.5 mg by mouth every 6 hours as needed for Anxiety.  amLODIPine (NORVASC) 2.5 MG tablet Take 2.5 mg by mouth 2 times daily      SITagliptin (JANUVIA) 100 MG tablet Take 100 mg by mouth daily      famotidine (PEPCID) 10 MG tablet Take 10 mg by mouth 2 times daily      fluorouracil (EFUDEX) 5 % cream Apply to the top of the scalp twice daily for 28 days. 40 g 0    clopidogrel (PLAVIX) 75 MG tablet Take 75 mg by mouth daily      ONE TOUCH ULTRA TEST strip TEST BLOOD SUGAR ONCE A DAY AS NEEDED 922 strip 1    folic acid (FOLVITE) 1 MG tablet TAKE 1 TABLET EVERY DAY (SUBSTITUTED FOR FOLVITE) 90 tablet 0    metFORMIN (GLUCOPHAGE) 1000 MG tablet TAKE 1/2 TABLET EVERY MORNING  AND TAKE 1 TABLET EVERY EVENING 135 tablet 0    nitroGLYCERIN (NITROSTAT) 0.4 MG SL tablet Place 1 tablet under the tongue every 5 minutes as needed for Chest pain up to max of 3 total doses. If no relief after 1 dose, call 911. 25 tablet 3    Cyanocobalamin (VITAMIN B12 PO) Take by mouth      Iron Combinations (IRON COMPLEX PO) Take by mouth      pravastatin (PRAVACHOL) 40 MG tablet Take 40 mg by mouth daily.  ONE TOUCH LANCETS MISC 1 each by Does not apply route daily as needed. 100 each 3    Multiple Vitamins-Minerals (THERAPEUTIC MULTIVITAMIN-MINERALS) tablet Take 1 tablet by mouth daily.       acetaminophen (TYLENOL) 500 MG tablet Take 500 mg by mouth every 6 hours as needed for Pain.  loperamide (IMODIUM) 2 MG capsule Take 2 mg by mouth as needed for Diarrhea.  Blood Glucose Calibration (ONETOUCH ULTRA CONTROL VI) by In Vitro route.  spironolactone (ALDACTONE) 25 MG tablet Take 25 mg by mouth daily.  tamsulosin (FLOMAX) 0.4 MG capsule Take 0.4 mg by mouth daily.  aspirin 81 MG tablet Take 81 mg by mouth daily.  Ascorbic Acid (VITAMIN C) 250 MG tablet Take 500 mg by mouth daily. Physical Examination       The following were examined and determined to be normal: Psych/Neuro, Head/face, Conjunctivae/eyelids, Gums/teeth/lips, Neck, Breast/axilla/chest, Abdomen, Back, LUE, RLE, LLE and Nails/digits. The following were examined and determined to be abnormal: Scalp/hair and RUE. Well appearing. 1.  Right distal extensor forearm with a 5 mm hyperkeratotic pink papule. 2.  Left vertex scalp with 2 scaly pink-tan patches. Assessment and Plan     1. Neoplasm of uncertain behavior of skin, right forearm - HAK vs SCC    Discussed likely diagnosis; patient agreeable to shave biopsy and curettage (written consent obtained). We also discussed the risks of bleeding, scar, and infection. The area(s) to be biopsied were marked with a surgical pen. Alcohol was used to cleanse the site. Local anesthesia was acheived with 1% lidocaine with epinephrine. Shave biopsy was performed using a razor blade followed by sharp curettage in multiple directions. Hemostasis was achieved with aluminum chloride. The wound(s) were dressed with petrolatum and covered with a bandage. Wound care instructions were reviewed. 1 Specimen (s) sent to pathology. The specimen bottles were appropriately labeled. The patient tolerated the procedure well and there were no immediate complications. 2. AK (actinic keratosis)     Cryotherapy was discussed and patient agreed to proceed.   Consent was obtained. 2 lesions were treated cryotherapy: left vertex scalp. 2 cycles of liquid nitrogen applied to each lesion for 5 seconds using a Cry-Ac cryo spray gun. Patient was educated regarding the potential risks of blister formation and discomfort. Wound care was discussed. The patient tolerated the procedure well and there were no immediate complications. Return in about 6 months (around 11/19/2022).     --Vipul Rosas MD

## 2022-05-23 LAB — DERMATOLOGY PATHOLOGY REPORT: ABNORMAL

## 2022-10-31 ENCOUNTER — TELEPHONE (OUTPATIENT)
Dept: DERMATOLOGY | Age: 82
End: 2022-10-31

## 2022-10-31 NOTE — TELEPHONE ENCOUNTER
Pt called in stated they was confused pt and wife Tonya Nino are schedule 11/16/22 and 11/17/22 they usually always come together due to living over a hour away Pts needs appt together   Please advise   Thanks  C/b [phone]

## 2022-11-02 ENCOUNTER — TELEPHONE (OUTPATIENT)
Dept: DERMATOLOGY | Age: 82
End: 2022-11-02

## 2022-11-02 NOTE — TELEPHONE ENCOUNTER
[phone]. Patient called about appointment conflicts for him and his wife. Ashwini Ariza has an appointment w/  on November 17th at [address] and his wife Wood Dumont has an appointment w/  on November 16th at 12:15pm.    They need their appointments on the same day, for back to back times. Patient is unsure how or why their appointments were made for separate days but he is asking for this to be advised as soon as possible.      Thank you!!

## 2022-11-04 ENCOUNTER — OFFICE VISIT (OUTPATIENT)
Dept: DERMATOLOGY | Age: 82
End: 2022-11-04
Payer: MEDICARE

## 2022-11-04 DIAGNOSIS — L57.0 AK (ACTINIC KERATOSIS): Primary | ICD-10-CM

## 2022-11-04 PROCEDURE — 17004 DESTROY PREMAL LESIONS 15/>: CPT | Performed by: DERMATOLOGY

## 2022-11-04 RX ORDER — ESCITALOPRAM OXALATE 5 MG/1
TABLET ORAL
COMMUNITY
Start: 2022-10-03

## 2022-11-04 RX ORDER — GLIPIZIDE 5 MG/1
TABLET ORAL
COMMUNITY
Start: 2022-09-16

## 2022-11-04 RX ORDER — APIXABAN 5 MG/1
TABLET, FILM COATED ORAL
COMMUNITY
Start: 2022-10-25

## 2022-11-04 RX ORDER — MIRTAZAPINE 7.5 MG/1
TABLET, FILM COATED ORAL
COMMUNITY
Start: 2022-10-07

## 2022-11-04 NOTE — PROGRESS NOTES
CHOLECYSTECTOMY  7/31/2009    COLON SURGERY      MOHS SURGERY      OTHER SURGICAL HISTORY  2013    removal of portacath    TOTAL HIP ARTHROPLASTY  6/1/2012       Allergies   Allergen Reactions    Cardizem [Diltiazem Hcl]      bradycardia    Celebrex [Celecoxib] Other (See Comments)     Ineffective. Darvon [Propoxyphene Hcl]     Percocet [Oxycodone-Acetaminophen]     Procardia [Nifedipine]      edema     Outpatient Medications Marked as Taking for the 11/4/22 encounter (Office Visit) with Talha Mishra MD   Medication Sig Dispense Refill    amLODIPine (NORVASC) 2.5 MG tablet Take 2.5 mg by mouth 2 times daily      famotidine (PEPCID) 10 MG tablet Take 10 mg by mouth 2 times daily      fluorouracil (EFUDEX) 5 % cream Apply to the top of the scalp twice daily for 28 days. 40 g 0    ONE TOUCH ULTRA TEST strip TEST BLOOD SUGAR ONCE A DAY AS NEEDED 774 strip 1    folic acid (FOLVITE) 1 MG tablet TAKE 1 TABLET EVERY DAY (SUBSTITUTED FOR FOLVITE) 90 tablet 0    Cyanocobalamin (VITAMIN B12 PO) Take by mouth      pravastatin (PRAVACHOL) 40 MG tablet Take 40 mg by mouth daily. ONE TOUCH LANCETS MISC 1 each by Does not apply route daily as needed. 100 each 3    Multiple Vitamins-Minerals (THERAPEUTIC MULTIVITAMIN-MINERALS) tablet Take 1 tablet by mouth daily. acetaminophen (TYLENOL) 500 MG tablet Take 500 mg by mouth every 6 hours as needed for Pain. loperamide (IMODIUM) 2 MG capsule Take 2 mg by mouth as needed for Diarrhea. Blood Glucose Calibration (ONETOUCH ULTRA CONTROL VI) by In Vitro route. Ascorbic Acid (VITAMIN C) 250 MG tablet Take 500 mg by mouth daily. Physical Examination       The following were examined and determined to be normal: Psych/Neuro, Conjunctivae/eyelids, Gums/teeth/lips, Neck, Breast/axilla/chest, Abdomen, Back, and RUE. The following were examined and determined to be abnormal: Scalp/hair, Head/face, and LUE. Well appearing.     1.  Left forearm - 3, left cheek - 3, left antitragus - 1, just below the left earlobe - 1, right cheek - 4, vertex scalp - 6: ill defined keratotic pink macules and patches. Assessment and Plan     1. AK (actinic keratosis) - 18     Cryotherapy was discussed and patient agreed to proceed. Consent was obtained. 18 lesions were treated cryotherapy: Left forearm - 3, left cheek - 3, left antitragus - 1, just below the left earlobe - 1, right cheek - 4, vertex scalp - 6. 2 cycles of liquid nitrogen applied to each lesion for 5 seconds using a [email or web address]-Ac cryo spray gun. Patient was educated regarding the potential risks of blister formation and discomfort. Wound care was discussed. The patient tolerated the procedure well and there were no immediate complications. Return in about 6 months (around 5/4/2023).     --Bita Beauchamp MD

## 2023-01-10 ENCOUNTER — TELEPHONE (OUTPATIENT)
Dept: DERMATOLOGY | Age: 83
End: 2023-01-10

## 2023-01-10 NOTE — TELEPHONE ENCOUNTER
Pt says he spoke to the manager in Dr. Ness Osorio office about a billing issue the last time he was in. He is still having and issue and would like to speak to the manager.     His phone number is [phone]

## 2023-01-10 NOTE — TELEPHONE ENCOUNTER
I called pt. Left message with my office number to call me back. I called [employer] as asked them why the bills for Dr. Lorie Burks both 5/19/22 and 11/4/22 were not paid, per Darryl at [facility] is 2ndary to Medicare- this has to be billed to traditional Medicare first and it gets bill to his [employer].

## 2023-01-12 NOTE — TELEPHONE ENCOUNTER
Patient called me back, Lesley Becerra gave me his Medicare information and I put it in the system. (We did not have his Medicare information at all) I sent an email to billing department asking them to resubmit Dates of Service 1/11/22, 5/19/22 and 11/4/22 to the insurance company and then to [facility] as their secondary insurance. Lesley Becerra gave me this information on his wife as well.

## 2023-01-25 ENCOUNTER — TELEPHONE (OUTPATIENT)
Dept: DERMATOLOGY | Age: 83
End: 2023-01-25

## 2023-01-25 NOTE — TELEPHONE ENCOUNTER
Patient called said he was returning my call, I actually spoke to patient on 1/12/23 regarding his billing, he wasn't sure if his message was an old message. He said he still is getting bills. The last I talked with Florentino Bridges he gave me his medicare number which we did not have and I asked billing to bill Medicare and then [employer]. I have not heard anything back. I looked at his chart and I still see outstanding bills from 5/19/22 and 11/14/22. I emailed our billing department asking if they can look at his account, get back to me and him on this. I called patient back and informed him I sent another email to billing regarding his bills.

## 2023-02-14 ENCOUNTER — TELEPHONE (OUTPATIENT)
Dept: DERMATOLOGY | Age: 83
End: 2023-02-14

## 2023-02-14 NOTE — TELEPHONE ENCOUNTER
Pt is calling in regards to an insurance mix up he has. He received notice from [facility] from collections. He would like to speak with the manager. Please call him at either [phone] or his cell at [phone].

## 2023-03-13 NOTE — TELEPHONE ENCOUNTER
Spoke to patient on Friday 3/10/23 I told him I emailed customer service Billing. He told me he is not getting bills he was just following up on when he spoke to billing a month ago. I told him I emailed billing regarding his concerns and asked if they could call him back.

## 2023-05-04 ENCOUNTER — OFFICE VISIT (OUTPATIENT)
Dept: DERMATOLOGY | Age: 83
End: 2023-05-04
Payer: COMMERCIAL

## 2023-05-04 DIAGNOSIS — L57.0 AK (ACTINIC KERATOSIS): Primary | ICD-10-CM

## 2023-05-04 PROCEDURE — 17000 DESTRUCT PREMALG LESION: CPT | Performed by: DERMATOLOGY

## 2023-05-04 PROCEDURE — 99213 OFFICE O/P EST LOW 20 MIN: CPT | Performed by: DERMATOLOGY

## 2023-05-04 PROCEDURE — 1123F ACP DISCUSS/DSCN MKR DOCD: CPT | Performed by: DERMATOLOGY

## 2023-05-04 RX ORDER — SPIRONOLACTONE 25 MG/1
TABLET ORAL
COMMUNITY
Start: 2023-03-28

## 2023-05-04 RX ORDER — FLUOROURACIL 50 MG/G
CREAM TOPICAL
Qty: 40 G | Refills: 0 | Status: SHIPPED | OUTPATIENT
Start: 2023-05-04

## 2023-05-04 NOTE — PROGRESS NOTES
[facility] Dermatology  Juve Ashley MD  [address]  1940    78 y.o. male     Date of Visit: 5/4/2023    Chief Complaint: skin lesions    History of Present Illness:    He returns today to follow-up for history of actinic keratoses-has multiple lesions on the scalp today. None are bothersome. He has been treated with cryotherapy and Efudex in the past.    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 Nodular BCC of the right upper forehead-treated with Mohs by Dr. Javi Yan on 8/3/2016. Superficial BCC of the right superior lateral leg-treated with curettage on 4/14/2016     Has stage 3 CKD, type 2 DM. Has a pacemaker     Review of Systems:  Gen: Feels well, good sense of health. Past Medical History, Family History, Surgical History, Medications and Allergies reviewed.     Past Medical History:   Diagnosis Date    Abnormalities of the hair     Allergic rhinitis, cause unspecified     Arthritis     CAD (coronary artery disease)     Cancer ([address] 3/2009    Colon cancer and resection    Degeneration of cervical intervertebral disc     Diabetes mellitus ([address]     Heart attack ([address] 4/2010    Hyperlipidemia     Hypertension     Hypertrophy of prostate without urinary obstruction and other lower urinary tract symptoms (LUTS)     Nephrolithiasis     Osteoarthrosis, unspecified whether generalized or localized, unspecified site     Personal history of malignant neoplasm of large intestine     Unspecified hereditary and idiopathic peripheral neuropathy     Unspecified sleep apnea      Past Surgical History:   Procedure Laterality Date    CARDIAC SURGERY  4/9/2010    CABG x 2    CHOLECYSTECTOMY  7/31/2009    COLON SURGERY      MOHS

## 2023-05-04 NOTE — PATIENT INSTRUCTIONS
Apply fluorouracil 5% cream to the top of the scalp and scaly lesion on the left mid shin twice daily for 14 days.

## 2023-11-09 ENCOUNTER — OFFICE VISIT (OUTPATIENT)
Dept: DERMATOLOGY | Age: 83
End: 2023-11-09

## 2023-11-09 DIAGNOSIS — L57.0 AK (ACTINIC KERATOSIS): Primary | ICD-10-CM

## 2023-11-09 RX ORDER — FLUOROURACIL 50 MG/G
CREAM TOPICAL
Qty: 40 G | Refills: 0 | Status: SHIPPED | OUTPATIENT
Start: 2023-11-09

## 2023-11-09 NOTE — PROGRESS NOTES
[facility]  Holly Carvalho MD  [address]  1940    78 y.o. male     Date of Visit: 11/9/2023    Chief Complaint: skin lesions    History of Present Illness:    He presents today to follow up for a history of AKs - treated with cryotherapy and Efudex in the past.   He reports several lesions today. 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 Nodular BCC of the right upper forehead-treated with Mohs by Dr. Tashi Linton on 8/3/2016. Superficial BCC of the right superior lateral leg-treated with curettage on 4/14/2016     Has stage 3 CKD, type 2 DM. Has a pacemaker. Review of Systems:  Gen: Feels well, good sense of health. Past Medical History, Family History, Surgical History, Medications and Allergies reviewed.     Past Medical History:   Diagnosis Date    Abnormalities of the hair     Allergic rhinitis, cause unspecified     Arthritis     CAD (coronary artery disease)     Cancer ([address]) 3/2009    Colon cancer and resection    Degeneration of cervical intervertebral disc     Diabetes mellitus ([address])     Heart attack ([address]) 4/2010    Hyperlipidemia     Hypertension     Hypertrophy of prostate without urinary obstruction and other lower urinary tract symptoms (LUTS)     Nephrolithiasis     Osteoarthrosis, unspecified whether generalized or localized, unspecified site     Personal history of malignant neoplasm of large intestine     Unspecified hereditary and idiopathic peripheral neuropathy     Unspecified sleep apnea      Past Surgical History:   Procedure Laterality Date    CARDIAC SURGERY  4/9/2010    CABG x 2    CHOLECYSTECTOMY  7/31/2009    COLON SURGERY      MOHS SURGERY      OTHER SURGICAL HISTORY  2013

## 2024-08-27 ENCOUNTER — OFFICE VISIT (OUTPATIENT)
Dept: DERMATOLOGY | Age: 84
End: 2024-08-27
Payer: MEDICARE

## 2024-08-27 DIAGNOSIS — L57.0 AK (ACTINIC KERATOSIS): Primary | ICD-10-CM

## 2024-08-27 DIAGNOSIS — D48.5 NEOPLASM OF UNCERTAIN BEHAVIOR OF SKIN: ICD-10-CM

## 2024-08-27 PROCEDURE — G8421 BMI NOT CALCULATED: HCPCS | Performed by: DERMATOLOGY

## 2024-08-27 PROCEDURE — 1036F TOBACCO NON-USER: CPT | Performed by: DERMATOLOGY

## 2024-08-27 PROCEDURE — 11102 TANGNTL BX SKIN SINGLE LES: CPT | Performed by: DERMATOLOGY

## 2024-08-27 PROCEDURE — 1123F ACP DISCUSS/DSCN MKR DOCD: CPT | Performed by: DERMATOLOGY

## 2024-08-27 PROCEDURE — G8427 DOCREV CUR MEDS BY ELIG CLIN: HCPCS | Performed by: DERMATOLOGY

## 2024-08-27 PROCEDURE — 99213 OFFICE O/P EST LOW 20 MIN: CPT | Performed by: DERMATOLOGY

## 2024-08-27 RX ORDER — FLUOROURACIL 50 MG/G
CREAM TOPICAL
Qty: 40 G | Refills: 0 | Status: SHIPPED | OUTPATIENT
Start: 2024-08-27

## 2024-08-27 NOTE — PROGRESS NOTES
[facility]  Bro Peters MD  [phone]      Patel Walker  1940    84 y.o. male     Date of Visit: 8/27/2024    Chief Complaint: AKs    History of Present Illness:    1.  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  He treated the area with Efudex since last visit with improvement.  He has several remaining lesions today.    2.  He also reports a persistent lesion on the left lower shin.  He was recently prescribed clotrimazole to treat this area.    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  Nodular BCC of the right upper forehead-treated with Mohs by Dr. Bailey on 8/3/2016.  Superficial BCC of the right superior lateral leg-treated with curettage on 4/14/2016     Has stage 3 CKD, type 2 DM.       Has a pacemaker.       Review of Systems:  Gen: Feels well, good sense of health.    Past Medical History, Family History, Surgical History, Medications and Allergies reviewed.    Past Medical History:   Diagnosis Date    Abnormalities of the hair     Allergic rhinitis, cause unspecified     Arthritis     CAD (coronary artery disease)     Cancer (HCC) 3/2009    Colon cancer and resection    Degeneration of cervical intervertebral disc     Diabetes mellitus (HCC)     Heart attack (HCC) 4/2010    Hyperlipidemia     Hypertension     Hypertrophy of prostate without urinary obstruction and other lower urinary tract symptoms (LUTS)     Nephrolithiasis     Osteoarthrosis, unspecified whether generalized or localized, unspecified site     Personal history of malignant neoplasm of large intestine     Unspecified hereditary and idiopathic peripheral neuropathy     Unspecified sleep apnea      Past Surgical History:   Procedure

## 2024-08-27 NOTE — PATIENT INSTRUCTIONS
Biopsy Wound Care Instructions    Keep the bandage in place for 24 hours.   Cleanse the wound with mild soapy water daily  Gently dry the area.  Apply Vaseline or petroleum jelly to the wound using a cotton tipped applicator.  Cover with a clean bandage.  Repeat this process until the biopsy site is healed.  If you had stitches placed, continue treating the site until the stitches are removed. Remember to make an appointment to return to have your stitches removed by our staff.  You may shower and bathe as usual.       ** Biopsy results generally take around 7 business days to come back.  If you have not heard from us by then, please call the office at [phone].    *Please note that biopsy results are released to both the patient and physician at the same time in [facility].  Please allow time for your physician to review the results.  One of our staff members will reach out to you with the results and plan.       Kevin Manley has a few precancerous lesions on the crown of the scalp, left temple and left cheek.  These are the rough feeling scaly pink patches.  I'm prescribing 5-fluorouracil cream to apply to these twice daily for 2 weeks.  Please let me know if you have any questions.     Jacinto

## 2024-08-29 ENCOUNTER — TELEPHONE (OUTPATIENT)
Dept: DERMATOLOGY | Age: 84
End: 2024-08-29

## 2024-08-29 NOTE — TELEPHONE ENCOUNTER
Informed patient that biopsy results are still pending but that we would contact him once they're back and Dr Peters has reviewed them.   He verbalized understanding.

## 2024-08-30 ENCOUNTER — TELEPHONE (OUTPATIENT)
Dept: DERMATOLOGY | Age: 84
End: 2024-08-30

## 2024-08-30 NOTE — TELEPHONE ENCOUNTER
Patient calling again for test results would like to know what the hold up is left cell  number for return call [phone] wifes cell is [phone].

## 2024-08-30 NOTE — TELEPHONE ENCOUNTER
Patient calling again for test results would like to know what the hold up is left cell  number for return call [phone] wifes cell is [phone].

## 2024-10-03 ENCOUNTER — TELEPHONE (OUTPATIENT)
Dept: DERMATOLOGY | Age: 84
End: 2024-10-03

## 2024-10-03 NOTE — TELEPHONE ENCOUNTER
View All Conversations on this Encounter  [facility] Derm Clinical Staff Pool (supporting Bro Peters MD)30 minutes ago (11:27 AM)     KL  We have finished the tube of Fluorouracil Cream we were using on Kevin’s leg. Shall we just leave it open to air with no other treatment?  The site remains red and where the biopsy was taken it is leaking serosanguineous fluid.

## 2024-10-04 NOTE — TELEPHONE ENCOUNTER
Called and left message for patient asking him to send a photo of his leg.   Advised him that redness and significant irritation after use of Efudex are expected.

## 2024-11-14 ENCOUNTER — OFFICE VISIT (OUTPATIENT)
Dept: DERMATOLOGY | Age: 84
End: 2024-11-14
Payer: MEDICARE

## 2024-11-14 DIAGNOSIS — D04.72 SQUAMOUS CELL CARCINOMA IN SITU (SCCIS) OF SKIN OF LEFT LOWER LEG: Primary | ICD-10-CM

## 2024-11-14 DIAGNOSIS — L57.0 AK (ACTINIC KERATOSIS): ICD-10-CM

## 2024-11-14 DIAGNOSIS — C44.519 BCC (BASAL CELL CARCINOMA), CHEST: ICD-10-CM

## 2024-11-14 PROCEDURE — 17262 DSTRJ MAL LES T/A/L 1.1-2.0: CPT | Performed by: DERMATOLOGY

## 2024-11-14 PROCEDURE — G8427 DOCREV CUR MEDS BY ELIG CLIN: HCPCS | Performed by: DERMATOLOGY

## 2024-11-14 PROCEDURE — 1123F ACP DISCUSS/DSCN MKR DOCD: CPT | Performed by: DERMATOLOGY

## 2024-11-14 PROCEDURE — 17000 DESTRUCT PREMALG LESION: CPT | Performed by: DERMATOLOGY

## 2024-11-14 PROCEDURE — 1159F MED LIST DOCD IN RCRD: CPT | Performed by: DERMATOLOGY

## 2024-11-14 PROCEDURE — G8421 BMI NOT CALCULATED: HCPCS | Performed by: DERMATOLOGY

## 2024-11-14 PROCEDURE — 99212 OFFICE O/P EST SF 10 MIN: CPT | Performed by: DERMATOLOGY

## 2024-11-14 PROCEDURE — 1036F TOBACCO NON-USER: CPT | Performed by: DERMATOLOGY

## 2024-11-14 PROCEDURE — G8484 FLU IMMUNIZE NO ADMIN: HCPCS | Performed by: DERMATOLOGY

## 2024-11-14 PROCEDURE — 17003 DESTRUCT PREMALG LES 2-14: CPT | Performed by: DERMATOLOGY

## 2024-11-14 NOTE — PROGRESS NOTES
idiopathic peripheral neuropathy     Unspecified sleep apnea      Past Surgical History:   Procedure Laterality Date    CARDIAC SURGERY  4/9/2010    CABG x 2    CHOLECYSTECTOMY  7/31/2009    COLON SURGERY      MOHS SURGERY      OTHER SURGICAL HISTORY  2013    removal of portacath    TOTAL HIP ARTHROPLASTY  6/1/2012       Allergies   Allergen Reactions    Cardizem [Diltiazem Hcl]      bradycardia    Celebrex [Celecoxib] Other (See Comments)     Ineffective.     Darvon [Propoxyphene Hcl]     Percocet [Oxycodone-Acetaminophen]     Procardia [Nifedipine]      edema     Outpatient Medications Marked as Taking for the 11/14/24 encounter (Office Visit) with Bro Peters MD   Medication Sig Dispense Refill    fluorouracil (EFUDEX) 5 % cream Apply topically 2 times daily for 14 days. 40 g 0    fluorouracil (EFUDEX) 5 % cream Apply to the top of the scalp twice daily for 2 weeks. 40 g 0    Multiple Vitamins-Minerals (OCUVITE PO) Take by mouth      spironolactone (ALDACTONE) 25 MG tablet       ELIQUIS 5 MG TABS tablet       vitamin D 25 MCG (1000 UT) CAPS Take 1 capsule by mouth daily      escitalopram (LEXAPRO) 5 MG tablet       glipiZIDE (GLUCOTROL) 5 MG tablet       mirtazapine (REMERON) 7.5 MG tablet       amLODIPine (NORVASC) 2.5 MG tablet Take 1 tablet by mouth 2 times daily      famotidine (PEPCID) 10 MG tablet Take 1 tablet by mouth 2 times daily      ONE TOUCH ULTRA TEST strip TEST BLOOD SUGAR ONCE A DAY AS NEEDED 100 strip 1    folic acid (FOLVITE) 1 MG tablet TAKE 1 TABLET EVERY DAY (SUBSTITUTED FOR FOLVITE) 90 tablet 0    nitroGLYCERIN (NITROSTAT) 0.4 MG SL tablet Place 1 tablet under the tongue every 5 minutes as needed for Chest pain up to max of 3 total doses. If no relief after 1 dose, call 911. 25 tablet 3    Cyanocobalamin (VITAMIN B12 PO) Take by mouth      pravastatin (PRAVACHOL) 40 MG tablet Take 1 tablet by mouth daily      ONE TOUCH LANCETS MISC 1 each by Does not apply route daily as needed. 100

## 2024-11-14 NOTE — PATIENT INSTRUCTIONS

## 2024-11-19 LAB — DERMATOLOGY PATHOLOGY REPORT: ABNORMAL

## 2025-08-19 ENCOUNTER — TELEPHONE (OUTPATIENT)
Age: 85
End: 2025-08-19

## 2025-08-26 ENCOUNTER — OFFICE VISIT (OUTPATIENT)
Age: 85
End: 2025-08-26
Payer: MEDICARE

## 2025-08-26 DIAGNOSIS — C44.519 BCC (BASAL CELL CARCINOMA), BACK: ICD-10-CM

## 2025-08-26 DIAGNOSIS — L82.1 SK (SEBORRHEIC KERATOSIS): ICD-10-CM

## 2025-08-26 DIAGNOSIS — L57.0 AK (ACTINIC KERATOSIS): Primary | ICD-10-CM

## 2025-08-26 PROCEDURE — 17261 DSTRJ MAL LES T/A/L .6-1.0CM: CPT | Performed by: DERMATOLOGY

## 2025-08-26 PROCEDURE — 17003 DESTRUCT PREMALG LES 2-14: CPT | Performed by: DERMATOLOGY

## 2025-08-26 PROCEDURE — 99211 OFF/OP EST MAY X REQ PHY/QHP: CPT | Performed by: DERMATOLOGY

## 2025-08-26 PROCEDURE — 17000 DESTRUCT PREMALG LESION: CPT | Performed by: DERMATOLOGY

## 2025-08-28 LAB — DERMATOLOGY PATHOLOGY REPORT: ABNORMAL
